# Patient Record
Sex: MALE | Race: WHITE | Employment: OTHER | URBAN - METROPOLITAN AREA
[De-identification: names, ages, dates, MRNs, and addresses within clinical notes are randomized per-mention and may not be internally consistent; named-entity substitution may affect disease eponyms.]

---

## 2018-09-10 ENCOUNTER — TRANSFERRED RECORDS (OUTPATIENT)
Dept: HEALTH INFORMATION MANAGEMENT | Facility: CLINIC | Age: 68
End: 2018-09-10

## 2019-05-30 ENCOUNTER — TRANSFERRED RECORDS (OUTPATIENT)
Dept: HEALTH INFORMATION MANAGEMENT | Facility: CLINIC | Age: 69
End: 2019-05-30

## 2019-07-30 ENCOUNTER — TRANSFERRED RECORDS (OUTPATIENT)
Dept: HEALTH INFORMATION MANAGEMENT | Facility: CLINIC | Age: 69
End: 2019-07-30

## 2019-10-07 ENCOUNTER — TRANSFERRED RECORDS (OUTPATIENT)
Dept: HEALTH INFORMATION MANAGEMENT | Facility: CLINIC | Age: 69
End: 2019-10-07

## 2019-11-20 ENCOUNTER — TRANSFERRED RECORDS (OUTPATIENT)
Dept: HEALTH INFORMATION MANAGEMENT | Facility: CLINIC | Age: 69
End: 2019-11-20

## 2020-01-14 ENCOUNTER — TRANSFERRED RECORDS (OUTPATIENT)
Dept: HEALTH INFORMATION MANAGEMENT | Facility: CLINIC | Age: 70
End: 2020-01-14

## 2020-06-19 ENCOUNTER — TRANSFERRED RECORDS (OUTPATIENT)
Dept: HEALTH INFORMATION MANAGEMENT | Facility: CLINIC | Age: 70
End: 2020-06-19

## 2020-09-30 ENCOUNTER — MEDICAL CORRESPONDENCE (OUTPATIENT)
Dept: HEALTH INFORMATION MANAGEMENT | Facility: CLINIC | Age: 70
End: 2020-09-30

## 2020-09-30 ENCOUNTER — TRANSFERRED RECORDS (OUTPATIENT)
Dept: HEALTH INFORMATION MANAGEMENT | Facility: CLINIC | Age: 70
End: 2020-09-30

## 2020-10-01 ENCOUNTER — REFERRAL (OUTPATIENT)
Dept: TRANSPLANT | Facility: CLINIC | Age: 70
End: 2020-10-01

## 2020-10-01 DIAGNOSIS — J84.112 IPF (IDIOPATHIC PULMONARY FIBROSIS) (H): Primary | ICD-10-CM

## 2020-10-01 NOTE — LETTER
October 2, 2020      Eric Victor  [address]  [facility] [address]        Dear Eric,    Thank you for your interest in the Transplant Center at [facility], [facility]. We look forward to being a part of your care team and assisting you through the transplant process.    As we discussed, your transplant coordinator is Gloria Cloud (Lung).  You may call your coordinator at any time with questions or concerns.  Your first scheduled call will be on October 15, 2020 between 10:00 am and 12:00 pm.  If this needs to change, call [phone].    Please complete the following.    1. Fill out and return the enclosed forms    Authorization for Electronic Communication    Authorization to Discuss Protected Health Information    Authorization for Release of Protected Health Information    Authorization for Care Everywhere Release of Information    2. Sign up for:    Coinfloorjarettt, access to your electronic medical record (see enclosed pamphlet)    [facility] [email or web address], a transplant education website  You can use these tools to learn more about your transplant, communicate with your care team, and track your medical details    Sincerely,    Solid Organ Transplant  [facility], [facility]  cc:  Michelle Kent MD (PCP), constance Moody MD(Referring)

## 2020-10-01 NOTE — LETTER
Date: 2020    To: [facility] Film Room      Fax: [phone]  Re: Eric Victor    1950                 IMAGES NEEDED    Your patient, was referred and is being evaluated as a possible solid organ transplant candidate at [facility]. In order to make the best possible recommendations for this patient, we require the following IMAGES:      Chest CT   (All images done within the last 24 months or most recent)    Chest Xray (most recent)    Echocardiogram (most recent)    All requested information needs to arrive at our facility within 3-5 business days      Please fax all reports and paper records to [phone] .    Requested imaging may be electronically sent to the [facility] imaging system via [id number] DICOM connection. When unable to send imaging electronically, an exported DICOM CD may be sent. Please indicate when imaging has been sent electronically on your return cover sheet.    Requested pathology slides should be accompanied by the appropriate report from your institution.    If the patient is hand carrying requested records or the requested records are not at your facility, please indicate this information on a return cover sheet.    If you have any questions or problems, call our office at [phone].      [facility]  Solid Organ Transplant Office  Attn:    [address], [facility] [address]  [place] [address]    Sincerely,      Solid Organ Transplant Admin

## 2020-10-01 NOTE — Clinical Note
Appointment Request: NPT with Dr. Souza on 11/3 at 1:10 pm by video New or Return Visit: new In Person Visit?: No  Reason for Visit/Diagnosis: ILD Orders Placed: N/A  Appointment Timeframe Requested: 11/03 Patient Aware? Yes. Physician Override Approved? N/A   Thank you,  Gloria Cloud RN

## 2020-10-01 NOTE — TELEPHONE ENCOUNTER
Patient Call: Eric. Calling back to start lung referral Please call his cell # [phone]      Call back needed? Yes    Return Call Needed  Same as documented in contacts section  When to return call?: Same day: Route High Priority

## 2020-10-02 VITALS — HEIGHT: 71 IN | BODY MASS INDEX: 21.56 KG/M2 | WEIGHT: 154 LBS

## 2020-10-02 SDOH — HEALTH STABILITY: MENTAL HEALTH: HOW OFTEN DO YOU HAVE 6 OR MORE DRINKS ON ONE OCCASION?: LESS THAN MONTHLY

## 2020-10-02 SDOH — HEALTH STABILITY: MENTAL HEALTH: HOW OFTEN DO YOU HAVE A DRINK CONTAINING ALCOHOL?: NOT ASKED

## 2020-10-02 SDOH — HEALTH STABILITY: MENTAL HEALTH: HOW MANY STANDARD DRINKS CONTAINING ALCOHOL DO YOU HAVE ON A TYPICAL DAY?: NOT ASKED

## 2020-10-02 ASSESSMENT — MIFFLIN-ST. JEOR: SCORE: 1470.67

## 2020-10-02 NOTE — TELEPHONE ENCOUNTER
SOT LUNG INTAKE DAVID    October 1, 2020    Referring Provider: Zuleima Moreira MD  Primary Provider: Michelle Kent (only 1 visit)  Specialist: none  Diagnosis:IPF  Source/Facility: [facility]    Smoking/nicotine use history: no  Alcohol use history: less than 1 per month  Drug use history: none  Cancer history:  none  Cardiac history:  none  BMI:21.5  O2 supplement at noc prn    Notes: Due to COVID, verbal consent received for Care Everywhere Authorization from the patient during this call.    Is patient in a group home/assisted living? no  Does patient have a guardian? no    Referral intake process completed.  Patient is aware that after financial approval is received, medical records will be requested.   Patient confirmed for a callback from transplant coordinator on October 15, 2020. (within 2 weeks)  Tentative evaluation date TBD. (within 4 weeks)    Confirmed coordinator will discuss evaluation process in more detail at the time of their call.   Patient is aware of the need to arrange age appropriate cancer screening, vaccinations, and dental care.  Reminded patient to complete questionnaire, complete medical records release, and review packet prior to evaluation visit .  Assessed patient for special needs (ie--wheelchair, assistance, guardian, and ):  none   Patient instructed to call [phone] with questions.

## 2020-10-06 ENCOUNTER — TRANSFERRED RECORDS (OUTPATIENT)
Dept: HEALTH INFORMATION MANAGEMENT | Facility: CLINIC | Age: 70
End: 2020-10-06

## 2020-10-15 NOTE — TELEPHONE ENCOUNTER
"SOT LUNG INTAKE    October 15, 2020    Eric Victor  [id number]  Referring Provider: Zuleima Moreira   Source/Facility: Care Everywhere/Epic/patient   Referral packet and welcome letter received? No, requested that it be re-sent.     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  70 year old    Height: 5' 11\"  Weight: 154 lbs  BMI: 21.5    Social History:    Social History     Socioeconomic History     Marital status:      Spouse name: Not on file     Number of children: Not on file     Years of education: Not on file     Highest education level: Not on file   Occupational History     Not on file   Social Needs     Financial resource strain: Not on file     Food insecurity     Worry: Not on file     Inability: Not on file     Transportation needs     Medical: Not on file     Non-medical: Not on file   Tobacco Use     Smoking status: Never Smoker     Smokeless tobacco: Never Used   Substance and Sexual Activity     Alcohol use: Yes     Binge frequency: Less than monthly     Drug use: Never     Sexual activity: Not on file   Lifestyle     Physical activity     Days per week: Not on file     Minutes per session: Not on file     Stress: Not on file   Relationships     Social connections     Talks on phone: Not on file     Gets together: Not on file     Attends Scientologist service: Not on file     Active member of club or organization: Not on file     Attends meetings of clubs or organizations: Not on file     Relationship status: Not on file     Intimate partner violence     Fear of current or ex partner: Not on file     Emotionally abused: Not on file     Physically abused: Not on file     Forced sexual activity: Not on file   Other Topics Concern     Parent/sibling w/ CABG, MI or angioplasty before 65F 55M? Not Asked   Social History Narrative     Not on file         Second-hand Smoke exposure: no    Family History:    No family history on file.    Past Medical History  Pulmonary " Manifestations date: Was first seen by Dr. Jon in 2013.  At the time had been aware of ILD for 3 years.  Had admission with ARDS around 2010.    Details: Since being followed by Dr. Jon, has steadily worsened more recently and is now interested in transplant.  Off systemic steroids and chronic azithromycin since 2016.  Flare in May 2019, treated with steroids.  Pred up and down since then, started OFEV in Feb 2020.    Diabetes: no  Coronary Artery Disease: no  Hypertension: no   Previous transfusion(s): yes, 2009 with lung biopsy  History of Cancer: no   GERD: no   Bleeding/Clotting/HIT Disorders: no  GI/ history: no, except for diarrhea with OFEV, decreased dose to 100 mg and OK    Other Past Medical History:      Past Medical History:   Diagnosis Date     History of blood transfusion 2009    [facility]     Uncomplicated asthma        Surgical History   Lung Biopsy: [facility]: wedge biopsy RLL, RUL 10/09/2009 patchy organizing acute lung injury with surrounding  cellular interstitial infiltrates. Mild airway changes consistent  with chronic asthma. (see outside report).   Per Eric Braswell: feels that the  changes here are those of organizing acute lung injury that is  histologically nonspecific. He goes on to say that his  differential diagnosis would include organizing infection,  evolving connective tissue disease, cryptogenic organizing  pneumonitis/organizing eosinophilic pneumonia, drug reaction and  other rare entities. He favors a steroid-treated eosinophilic  pneumonia.      Obtained in setting of hypoxic respiratory failure, felt to be eosinophilic pneumonia.   Post op bleeding which led to redo thoracotomy on 10/10--clot evacuated.  Extubated 10/12. Chest tube out 10/20.    Additional bronch/BAL 2/28/12 Positive for hemosiderin macrophages and rare eosinophils.    Pneumothorax: delayed surgical recovery above  Chest Surgery: yes    Past Surgical History:   Procedure Laterality Date      BIOPSY  2009    [facility]     BIOPSY  2018    colon polyp, [place]     COLONOSCOPY  2018     ORTHOPEDIC SURGERY  1990    hand       Mental Health History  Depression: not discussed   Anxiety:    Other:      Medications  Current Outpatient Medications   Medication     aspirin 81 MG chewable tablet     azithromycin (ZITHROMAX) 250 MG tablet     mometasone-formoterol (DULERA) 100-5 MCG/ACT oral inhaler     predniSONE (DELTASONE) 5 MG tablet     No current facility-administered medications for this visit.      Blood thinner hx: no  Prednisone hx: full time since 2009, currently at 5 mg, occasional bursts to 40 mg. ?Off from [id number]   Antibiotic hx: rare, was on chronic azithromycin for 6 months or so, not since  Narcotic hx: never     Allergies  Patient has no known allergies.      Pulmonary Tests and Status  PFT's  Date: 1/14/20   FVC 1.43, 31%   FEV1 1.22 36%  DLCO 10.02, 31%  Date: 5/30/19   FVC  1.81, ?       FEV1  1.27, 35%  DLCO 10.32, 30    ABG's  Not found    6 Minute Walk: maybe a year or two  ago    Oxygen use--has oximeter, but has Raynauds, so it is hard to track   At rest: not using at rest, activity is limited, usually 93-95 at rest    Sleep: occasionally at night, sleeps better with it so usually using at night at 2 liters.   With Activity: used with activity in [place]   Date of O2 initiation: a months ago    Oxygen Company: [employer]   Contact name/number:      Sleep Study: has done an overnight oximetry study, just completed one on 2 liters, scanned in, 10/06/20--brief time <88.    BiPAP/CPAP: no    Pulmonary Rehab: no, but is very active, walks and weight lifting daily until COVID hit, unable to go to gym.    Date:   Location:        Current activity:      Current activity:  Basic ADLs    Feeding:    Toileting:     Selecting proper attire:    Grooming:    Maintaining continence:    Putting on clothing: slower, needs to stop to catch his breath   Bathing:    Walking &  Transferring: needs to stop at the top of the stairs.     Instrumental ADLs    Managing Finances:    Handling Transportation: yes   Shopping: yes, a little more work with using mask for COVID  Preparing meals:    Using telephone & communication devices:    Managing medications: independent  Housework & basic home maintenance: no longer mows, does do vacuuming.  Wife does the groceries.       Hospitalizations in prior 12 months  none    Diagnostic Tests/Imaging  Heart cath: no, no family history of CAD  Stress Test: no  ECHO: most recent 5/13/19: Interpretation Summary    * The left ventricular systolic function is normal, estimated LVEF 55-60%.    * The proximal ascending aorta is mildly dilated measuring 4.2 cm.    * Compared to prior study dated 5/30/2018 (TTE): similar findings; MR  appears mild in today's study.    Chest CT: 10/07/19: IMPRESSION:   1.  Pulmonary fibrosis with honeycombing at the lung bases. This is unchanged since the most recent exam, but has progressed when compared to more remote exams.  2.  No new acute finding or groundglass parenchymal opacity.    DEXA: 9/10/18  Osteoporosis  Lumbar Spine (L1-L4):  Average BMD (gm/cm2): 0.810  T-score: -2.6  Z-score: -1.7    Upper GI: no    Primary Care  Health Maintenance   Topic Date Due     HEPATITIS C SCREENING  1950     ADVANCE CARE PLANNING  1950     COLORECTAL CANCER SCREENING  06/26/1958     DTAP/TDAP/TD IMMUNIZATION (1 - Tdap) 06/26/1973     LIPID  06/26/1983     ZOSTER IMMUNIZATION (2 of 3) 09/24/2012     MEDICARE ANNUAL WELLNESS VISIT  06/26/2013     FALL RISK ASSESSMENT  06/26/2013     Pneumococcal Vaccine: 65+ Years (1 of 1 - PPSV23) 06/26/2013     AORTIC ANEURYSM SCREENING (SYSTEM ASSIGNED)  06/26/2013     PHQ-2  01/01/2020     INFLUENZA VACCINE  Completed     Pneumococcal Vaccine: Pediatrics (0 to 5 Years) and At-Risk Patients (6 to 64 Years)  Aged Out     IPV IMMUNIZATION  Aged Out     MENINGITIS IMMUNIZATION  Aged Out      HEPATITIS B IMMUNIZATION  Aged Out     PSA: 8/20/18: 1.54    Colonoscopy: 10/19/18: multiple (9) polyps, tubular and tubulovillous adenomas  Dental: has a tooth that is due to be pulled.  Otherwise is up to date, goes in annually  Hepatitis A/B: not found   Pneumovax: Prevnar 13 8/20/18, Pneumovax 10/12/16, 9/01/11    Labs  A1AT: not found  Rheumatology: 2009, elevated IgA,   Cystic Fibrosis: n/a   Cultures: none concerning        Psycho-Social Assessment  Spouse/Significant Other/Partner: , 3 sons, 9 grandchildren    Location:     Distance from [facility]:    Support System:     Location:     Distance from [facility]:      Employment Status: Retired 2013  (Full-time, part-time, disabled, etc.)  Occupation:  in manufacturing environment  Work history:    Toxic Substance Exposure: watched air quality seriously at work, 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   (Apartment, house, stairs, mold, etc.)  Pets/Birds: dog    Home Health care utilized:      Financial Concerns:        Notes: discussed consult visit.  Discussed age as a relative contraindication, no other concerning past medical history      Plan: clinic visit with Dr. Souza 11/03 by video     Concerns: osteoporosis but had remained active, working out--limited by covid.  Longer prednisone history, colon needs monitoring.

## 2020-11-02 PROBLEM — I73.00 RAYNAUD'S DISEASE WITHOUT GANGRENE: Status: ACTIVE | Noted: 2017-02-08

## 2020-11-02 NOTE — PROGRESS NOTES
[facility]  [facility]  Transplant Clinic - Initial Visit -  November 3, 2020    Eric Victor is a 70 year old male who is being evaluated via a billable video visit.      Video-Visit Details    Type of service:  Video Visit    Video Start Time: 1:10 PM  Video End Time: 2:05 PM    Originating Location (pt. Location): Home    Distant Location (provider location):  [facility]     Platform used for Video Visit: Becky Souza MD             Assessment and Plan:   Eric Victor is a 70 year old male with history of ILD who is seen today for evaluation for lung transplantation.    1) Evaluation for lung transplantation  - There is no absolute contraindication identified today.  Some concerns are listed below:    A) Possible CKD  -His last BMP is from 8/2018 and showed creatinine of 1.26, up from his baseline of 0.8-0.9, corresponding to estimated GFR of 57.  It was explained to him that if he has significant CKD, given his advanced age he will likely not be considered a candidate at our center.  -We should repeat a BMP ASAP and determine whether he should move forward.    B) Advanced Age  -He is 70 years old this year, and thus his selection criteria for lung transplantation will be more vigorous.  He understands this.  He also understands that age his likely an independent risk factor for morbidity and mortality, although it is taken in context with other factors such as functional status, frailty, and comorbidities.     C) Concern for Coronary Artery Disease  -He is advanced in age and may have CKD.  He will need a left and right heart catheterization prior to selection meeting.    D) Dilation of His Ascending Aorta  -This should not be considered a contraindication.  This has been followed regularly, and has been stable at 4.2 cm on his most recent echocardiogram from 5/2019.    - In addition to a complete history and physical, I  "discussed transplant at length with the patient.  We reviewed the risks and benefits of transplantation.  Our discussion included the recent survival statistics.  I discussed rejection, acute and chronic.  I discussed the need for surveillance biopsies.  I reviewed the standard immunosuppression and typical side effects, including renal failure.  I discussed the increased risk of infection and the use of prophylactic antibiotics. I reviewed the increased risk of malignancy. I discussed the listing system, including the Lung Allocation Score.  I discussed the typical operative and postoperative course, and possible complications.  I reviewed the usual clinic followup.  I explained to the patient that he is required to stay in the [facility] for three months following transplantation and that he  will need to have someone accompanying him during that time.  I discussed the importance of strict medication and clinic adherence after transplantation.    -After a long discussion today, while he does have some doubts he would like to go forward with evaluation.  He states that he would like to \"live\", not just be alive after transplantation and enjoy time with his family including his wife, 3 sons and 9 grandchildren.  We discussed the complex nature of defining success after lung transplantation, and individual variation in with an acceptable quality of life.  His goal after transplantation is to enjoy time with his family, push his grandchildren on a swing, and if possible teach them fishing and spend quality time.      In addition, we discussed the current SARS-CoV-2 pandemic and what this might mean for transplant recipients in terms of visitor policy support system.    Coordinator/MD: Pedro Luis/Libby    RTC: After evaluation completed  Influenza and other vaccinations: He should receive a flu shot for the season      Dorita Souza MD [employer]  Pulmonary and Critical Care Medicine            Problem List:     1. Evaluation " "for lung transplantation    2. Interstitial lung disease   a. Chronic eosinophilic pneumonia, on chronic prednisone 5mg  b. Started on nintedanib 2/2020    3. Mild dilatation of ascending aorta  a. Stable 4.2 cm on his most recent echocardiogram ([facility]; 5/13/2019)        History of Present Illness:   Eric Victor is a 70 year old male with history of ILD who is seen today for evaluation for lung transplantation.    Diagnosed with IPF in 10/2009.  It was during the swine flu pandemic, developed low-grade fever that wouldn't go away.  Was normally active, running and biking, but had to quit and felt exhausted.  Last marathon was when he was 59yo.  9/26 went for a run and fever worsened.  Was admitted to the hospital and was placed on mechanical ventilation. Was intubated for about a week, got a surgical lung biopsy and initial read as \"fatal\", different read by [facility].    In 2012 he got pneumonia and was on mechanical ventilation at that time as well. He was intubated for a total of 2 weeks but never got trached. He was on a wheelchair by discharge, but in terms of activity level after several months of rehab was back to shoveling snow, mowing lawn.     2/2020 started on Ofev after noticing decline since May 2019. PFT in January showed decline as well.  Would be treated with Prednisone burst 20-30mg about 3-4 times in the last 1-2 year with improvement. Always has thick sputum before the declines. Every time Prednisone was decreased to 5mg or less he flared, and has been on 5mg daily since a few months ago.  Last flare up was June.      Was pushing his  as recent as last summer to mow his lawn.  He does a 250 feet walk with a slight incline - last year he was able to do that without stopping.  Now, can do it twice in a day, but has to stop in the middle. Showering has become more difficult these days.  Biggest problem is with bending down.  Can climb up a flight of stairs but has to " "stop and catch his breath at the top.  Up until COVID, was going to the gym every day and lifted weights, squatting etc. But sine then only exercise he gets is walking.    Uses 2L/min supplemental oxygen only at night. Has not had an ambulatory pulse oximeter in over a year. Just had an overnight oximetry 2L/min done and was told \"everthing is fine\".      Has had Raynaud's phenomenon, cold/numb finger in the cold.  Had exercise-induced asthma and took Albuterol PRN, onset about 41 yo. Has a remote history of Afib around the time of his hospitalization in 2009.     Last colonoscopy in 2018, recommend 1 year interval follow-up due to finding 8 polyps, path showed tubular and tubulovillous adenomas.  Up to date on dental except for one tooth that need to be pulled.    Lor his wife will be the primary support person.  Middle son Jasiel and youngest son Shahram both live in the [facility].           Review of Systems:   Please see HPI, otherwise the complete 10 point ROS is negative.           Past Medical and Surgical History:     Past Medical History:   Diagnosis Date     History of blood transfusion 2009    [facility]     Uncomplicated asthma      Past Surgical History:   Procedure Laterality Date     BIOPSY  2018    colon polyp, [place]     CL AFF SURGICAL PATHOLOGY  2009    [facility]     COLONOSCOPY  2018     ORTHOPEDIC SURGERY  1990    hand           Family History:     Family History   Problem Relation Age of Onset     Lupus Mother      Alzheimer Disease Mother         passed away in her 80's      Raynaud syndrome Mother      Liver Cancer Father         passed away from liver cancer in his mid 80's     Bone Cancer Father      Prostate Cancer Father             Social History:     Social History     Socioeconomic History     Marital status:      Spouse name: Not on file     Number of children: Not on file     Years of education: Not on file     Highest education level: Not on file   Occupational " History     Not on file   Social Needs     Financial resource strain: Not on file     Food insecurity     Worry: Not on file     Inability: Not on file     Transportation needs     Medical: Not on file     Non-medical: Not on file   Tobacco Use     Smoking status: Never Smoker     Smokeless tobacco: Never Used     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   Substance and Sexual Activity     Alcohol use: Yes     Frequency: 2-4 times a month     Drinks per session: 1 or 2     Binge frequency: Never     Drug use: Never     Sexual activity: Not on file   Lifestyle     Physical activity     Days per week: Not on file     Minutes per session: Not on file     Stress: Not on file   Relationships     Social connections     Talks on phone: Not on file     Gets together: Not on file     Attends Buddhist service: Not on file     Active member of club or organization: Not on file     Attends meetings of clubs or organizations: Not on file     Relationship status: Not on file     Intimate partner violence     Fear of current or ex partner: Not on file     Emotionally abused: Not on file     Physically abused: Not on file     Forced sexual activity: Not on file   Other Topics Concern     Parent/sibling w/ CABG, MI or angioplasty before 65F 55M? 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         Lives in [place].  Lived in [place] his entire life, has a cabin up [place].         Has three sons, eldest lives in [place].            Medications:     Current Outpatient Medications   Medication     calcium carbonate 600 mg-vitamin D 400 units (CALTRATE) 600-400 MG-UNIT per tablet     cholecalciferol 25 MCG (1000 UT) TABS     nintedanib (OFEV) 100 MG capsule     predniSONE (DELTASONE) 5 MG tablet     No current facility-administered medications for this visit.             Physical Exam:   Constitutional - looks  well, in no apparent distress  Eyes - no redness or discharge  Respiratory -breathing appears comfortable.  No cough.  Skin - No appreciable discoloration or lesions (very limited exam)  Neurological - No apparent tremors. Speech fluent and articlate  Psychiatric - no signs of delirium or anxiety     Exam limited to that easily identified on a virtual visit. The rest of a comprehensive physical examination is deferred due to PHE (public health emergency) video visit restrictions.         Data:   All laboratory and imaging data reviewed.      No results found for this or any previous visit (from the past 168 hour(s)).       Date Place TLC (%) FVC (%) FEV1 (%) FEV1/FVC DLCO (%) Note                               5/30/2019 [facility]   1.81  1.27 35  10.32 30    1/14/2020 [facility]   1.43 31 1.22 36  10.02 31             Imaging:     CT HR ([facility]; 10/7/2019)  IMPRESSION:   1.  Pulmonary fibrosis with honeycombing at the lung bases. This is unchanged since the most recent exam, but has progressed when compared to more remote exams.  2.  No new acute finding or groundglass parenchymal opacity.         Cardiac:     TTE ([facility]; 5/13/2019)  Interpretation Summary    * The left ventricular systolic function is normal, estimated LVEF 55-60%.    * The proximal ascending aorta is mildly dilated measuring 4.2 cm.    * Compared to prior study dated 5/30/2018 (TTE): similar findings; MR  appears mild in today's study.         Other:

## 2020-11-03 ENCOUNTER — VIRTUAL VISIT (OUTPATIENT)
Dept: TRANSPLANT | Facility: CLINIC | Age: 70
End: 2020-11-03
Attending: INTERNAL MEDICINE
Payer: MEDICARE

## 2020-11-03 DIAGNOSIS — J84.9 ILD (INTERSTITIAL LUNG DISEASE) (H): Primary | ICD-10-CM

## 2020-11-03 DIAGNOSIS — I77.810 MILD DILATION OF ASCENDING AORTA (H): ICD-10-CM

## 2020-11-03 DIAGNOSIS — J84.9 ILD (INTERSTITIAL LUNG DISEASE) (H): ICD-10-CM

## 2020-11-03 DIAGNOSIS — Z01.818 ENCOUNTER FOR PRE-TRANSPLANT EVALUATION FOR LUNG TRANSPLANT: Primary | ICD-10-CM

## 2020-11-03 DIAGNOSIS — Z79.899 ENCOUNTER FOR LONG-TERM (CURRENT) USE OF HIGH-RISK MEDICATION: ICD-10-CM

## 2020-11-03 PROCEDURE — 99205 OFFICE O/P NEW HI 60 MIN: CPT | Mod: 95 | Performed by: INTERNAL MEDICINE

## 2020-11-03 SDOH — HEALTH STABILITY: MENTAL HEALTH: HOW OFTEN DO YOU HAVE A DRINK CONTAINING ALCOHOL?: 2-4 TIMES A MONTH

## 2020-11-03 SDOH — HEALTH STABILITY: MENTAL HEALTH: HOW MANY STANDARD DRINKS CONTAINING ALCOHOL DO YOU HAVE ON A TYPICAL DAY?: 1 OR 2

## 2020-11-03 SDOH — HEALTH STABILITY: MENTAL HEALTH: HOW OFTEN DO YOU HAVE 6 OR MORE DRINKS ON ONE OCCASION?: NEVER

## 2020-11-03 NOTE — NURSING NOTE
Patient accompanied by: wife Lor for video visit   Current activity level: no exercise equipment at home, stays active but doing less than past.  Able to do stairs   Pulmonary Rehab: not currently due to covid  Recommendations: exercise, strengthening, endurance   Patient status assessment updated.    Current oxygen use:  Using 2 liters only at night, difficult to assess need with activity due to Raynauds.     Assisted Ventilation: no  Diabetic status: no (has required medication in past likely related to steroid dose   Data Unavailable 0 lbs 0 oz There is no height or weight on file to calculate BMI.    Medication changes: no   Plan: need to assess kidney function and verify oxygen needs with activity.  Per Dr. Souza, ordering oxygen titration study with forehead probe to be done and CSC, and will do BMP on same day.  [place] Monday [place]ues[place]

## 2020-11-03 NOTE — LETTER
11/3/2020         RE: Eric Victor  [address]  [facility] [address]        Dear Colleague,    Thank you for referring your patient, Eric Victor, to the [facility] TRANSPLANT [facility]IN[facility]. Please see a copy of my visit note below.    [facility]  Transplant Clinic - Initial Visit -  November 3, 2020    Eric Victor is a 70 year old male who is being evaluated via a billable video visit.      Video-Visit Details    Type of service:  Video Visit    Video Start Time: 1:10 PM  Video End Time: 2:05 PM    Originating Location (pt. Location): Home    Distant Location (provider location):  [facility] TRANSPL[facility]     Platform used for Video Visit: Becky Souza MD             Assessment and Plan:   Eric Victor is a 70 year old male with history of ILD who is seen today for evaluation for lung transplantation.    1) Evaluation for lung transplantation  - There is no absolute contraindication identified today.  Some concerns are listed below:    A) Possible CKD  -His last BMP is from 8/2018 and showed creatinine of 1.26, up from his baseline of 0.8-0.9, corresponding to estimated GFR of 57.  It was explained to him that if he has significant CKD, given his advanced age he will likely not be considered a candidate at our center.  -We should repeat a BMP ASAP and determine whether he should move forward.    B) Advanced Age  -He is 70 years old this year, and thus his selection criteria for lung transplantation will be more vigorous.  He understands this.  He also understands that age his likely an independent risk factor for morbidity and mortality, although it is taken in context with other factors such as functional status, frailty, and comorbidities.     C) Concern for Coronary Artery Disease  -He is advanced in age and may have CKD.  He will need a left and right heart catheterization prior to selection  "meeting.    D) Dilation of His Ascending Aorta  -This should not be considered a contraindication.  This has been followed regularly, and has been stable at 4.2 cm on his most recent echocardiogram from 5/2019.    - In addition to a complete history and physical, I discussed transplant at length with the patient.  We reviewed the risks and benefits of transplantation.  Our discussion included the recent survival statistics.  I discussed rejection, acute and chronic.  I discussed the need for surveillance biopsies.  I reviewed the standard immunosuppression and typical side effects, including renal failure.  I discussed the increased risk of infection and the use of prophylactic antibiotics. I reviewed the increased risk of malignancy. I discussed the listing system, including the Lung Allocation Score.  I discussed the typical operative and postoperative course, and possible complications.  I reviewed the usual clinic followup.  I explained to the patient that he is required to stay in the [facility] for three months following transplantation and that he  will need to have someone accompanying him during that time.  I discussed the importance of strict medication and clinic adherence after transplantation.    -After a long discussion today, while he does have some doubts he would like to go forward with evaluation.  He states that he would like to \"live\", not just be alive after transplantation and enjoy time with his family including his wife, 3 sons and 9 grandchildren.  We discussed the complex nature of defining success after lung transplantation, and individual variation in with an acceptable quality of life.  His goal after transplantation is to enjoy time with his family, push his grandchildren on a swing, and if possible teach them fishing and spend quality time.      In addition, we discussed the current SARS-CoV-2 pandemic and what this might mean for transplant recipients in terms of visitor policy " "support system.    Coordinator/MD: Nick    RTC: After evaluation completed  Influenza and other vaccinations: He should receive a flu shot for the season      Doriat Souza MD MSC[employer]  Pulmonary and Critical Care Medicine            Problem List:     1. Evaluation for lung transplantation    2. Interstitial lung disease   a. Chronic eosinophilic pneumonia, on chronic prednisone 5mg  b. Started on nintedanib 2/2020    3. Mild dilatation of ascending aorta  a. Stable 4.2 cm on his most recent echocardiogram ([facility]; 5/13/2019)        History of Present Illness:   Eric Victor is a 70 year old male with history of ILD who is seen today for evaluation for lung transplantation.    Diagnosed with IPF in 10/2009.  It was during the swine flu pandemic, developed low-grade fever that wouldn't go away.  Was normally active, running and biking, but had to quit and felt exhausted.  Last marathon was when he was 59yo.  9/26 went for a run and fever worsened.  Was admitted to the hospital and was placed on mechanical ventilation. Was intubated for about a week, got a surgical lung biopsy and initial read as \"fatal\", different read by [facility].    In 2012 he got pneumonia and was on mechanical ventilation at that time as well. He was intubated for a total of 2 weeks but never got trached. He was on a wheelchair by discharge, but in terms of activity level after several months of rehab was back to shoveling snow, mowing lawn.     2/2020 started on Ofev after noticing decline since May 2019. PFT in January showed decline as well.  Would be treated with Prednisone burst 20-30mg about 3-4 times in the last 1-2 year with improvement. Always has thick sputum before the declines. Every time Prednisone was decreased to 5mg or less he flared, and has been on 5mg daily since a few months ago.  Last flare up was June.      Was pushing his  as recent as last summer to mow his lawn.  He does a 250 feet walk " "with a slight incline - last year he was able to do that without stopping.  Now, can do it twice in a day, but has to stop in the middle. Showering has become more difficult these days.  Biggest problem is with bending down.  Can climb up a flight of stairs but has to stop and catch his breath at the top.  Up until COVID, was going to the gym every day and lifted weights, squatting etc. But sine then only exercise he gets is walking.    Uses 2L/min supplemental oxygen only at night. Has not had an ambulatory pulse oximeter in over a year. Just had an overnight oximetry 2L/min done and was told \"everthing is fine\".      Has had Raynaud's phenomenon, cold/numb finger in the cold.  Had exercise-induced asthma and took Albuterol PRN, onset about 39 yo. Has a remote history of Afib around the time of his hospitalization in 2009.     Last colonoscopy in 2018, recommend 1 year interval follow-up due to finding 8 polyps, path showed tubular and tubulovillous adenomas.  Up to date on dental except for one tooth that need to be pulled.    Lor his wife will be the primary support person.  Middle son Jasiel and youngest son Shahram both live in the [facility].           Review of Systems:   Please see HPI, otherwise the complete 10 point ROS is negative.           Past Medical and Surgical History:     Past Medical History:   Diagnosis Date     History of blood transfusion 2009    [facility]     Uncomplicated asthma      Past Surgical History:   Procedure Laterality Date     BIOPSY  2018    colon polyp, [place]      AFF SURGICAL PATHOLOGY  2009    [facility]     COLONOSCOPY  2018     ORTHOPEDIC SURGERY  1990    hand           Family History:     Family History   Problem Relation Age of Onset     Lupus Mother      Alzheimer Disease Mother         passed away in her 80's      Raynaud syndrome Mother      Liver Cancer Father         passed away from liver cancer in his mid 80's     Bone Cancer Father      Prostate " Cancer Father             Social History:     Social History     Socioeconomic History     Marital status:      Spouse name: Not on file     Number of children: Not on file     Years of education: Not on file     Highest education level: Not on file   Occupational History     Not on file   Social Needs     Financial resource strain: Not on file     Food insecurity     Worry: Not on file     Inability: Not on file     Transportation needs     Medical: Not on file     Non-medical: Not on file   Tobacco Use     Smoking status: Never Smoker     Smokeless tobacco: Never Used     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   Substance and Sexual Activity     Alcohol use: Yes     Frequency: 2-4 times a month     Drinks per session: 1 or 2     Binge frequency: Never     Drug use: Never     Sexual activity: Not on file   Lifestyle     Physical activity     Days per week: Not on file     Minutes per session: Not on file     Stress: Not on file   Relationships     Social connections     Talks on phone: Not on file     Gets together: Not on file     Attends Nondenominational service: Not on file     Active member of club or organization: Not on file     Attends meetings of clubs or organizations: Not on file     Relationship status: Not on file     Intimate partner violence     Fear of current or ex partner: Not on file     Emotionally abused: Not on file     Physically abused: Not on file     Forced sexual activity: Not on file   Other Topics Concern     Parent/sibling w/ CABG, MI or angioplasty before 65F 55M? 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         Lives in [place].  Lived in [place] his entire life, has a [place].         Has three sons, eldest lives in [place].            Medications:     Current Outpatient Medications   Medication     calcium carbonate 600 mg-vitamin D 400 units  (CALTRATE) 600-400 MG-UNIT per tablet     cholecalciferol 25 MCG (1000 UT) TABS     nintedanib (OFEV) 100 MG capsule     predniSONE (DELTASONE) 5 MG tablet     No current facility-administered medications for this visit.             Physical Exam:   Constitutional - looks well, in no apparent distress  Eyes - no redness or discharge  Respiratory -breathing appears comfortable.  No cough.  Skin - No appreciable discoloration or lesions (very limited exam)  Neurological - No apparent tremors. Speech fluent and articlate  Psychiatric - no signs of delirium or anxiety     Exam limited to that easily identified on a virtual visit. The rest of a comprehensive physical examination is deferred due to PHE (public health emergency) video visit restrictions.         Data:   All laboratory and imaging data reviewed.      No results found for this or any previous visit (from the past 168 hour(s)).       Date Place TLC (%) FVC (%) FEV1 (%) FEV1/FVC DLCO (%) Note                               5/30/2019 [facility]   1.81  1.27 35  10.32 30    1/14/2020 [facility]   1.43 31 1.22 36  10.02 31             Imaging:     CT HR ([facility]; 10/7/2019)  IMPRESSION:   1.  Pulmonary fibrosis with honeycombing at the lung bases. This is unchanged since the most recent exam, but has progressed when compared to more remote exams.  2.  No new acute finding or groundglass parenchymal opacity.         Cardiac:     TTE ([facility]; 5/13/2019)  Interpretation Summary    * The left ventricular systolic function is normal, estimated LVEF 55-60%.    * The proximal ascending aorta is mildly dilated measuring 4.2 cm.    * Compared to prior study dated 5/30/2018 (TTE): similar findings; MR  appears mild in today's study.         Other:           Again, thank you for allowing me to participate in the care of your patient.        Sincerely,        Dorita Souza MD

## 2020-11-03 NOTE — Clinical Note
Please schedule oxygen titration test (6mw) with forehead probe, and lab--[place] Monday or [place] next [place] [facility]  [place] rest of week, then back the following Mon/Tues.  Thanks,  Gloria

## 2020-11-04 ENCOUNTER — TELEPHONE (OUTPATIENT)
Dept: TRANSPLANT | Facility: CLINIC | Age: 70
End: 2020-11-04

## 2020-11-04 NOTE — TELEPHONE ENCOUNTER
Called patient lvm for appt (6MW) on Tues Nov 10 at 10am and lab at 1045am; asked for a call back.    Patient called back confirmed.

## 2020-11-06 ENCOUNTER — TELEPHONE (OUTPATIENT)
Dept: TRANSPLANT | Facility: CLINIC | Age: 70
End: 2020-11-06

## 2020-11-06 NOTE — TELEPHONE ENCOUNTER
Patient Call: Archie has a few follow up questions to ask         Call back needed? Yes    Return Call Needed  Same as documented in contacts section  When to return call?: Greater than one day: Route standard priority

## 2020-11-10 DIAGNOSIS — J84.9 ILD (INTERSTITIAL LUNG DISEASE) (H): ICD-10-CM

## 2020-11-10 LAB
6 MIN WALK (FT): 550 FT
6 MIN WALK (M): 168 M
ANION GAP SERPL CALCULATED.3IONS-SCNC: 4 MMOL/L (ref 3–14)
BUN SERPL-MCNC: 22 MG/DL (ref 7–30)
CALCIUM SERPL-MCNC: 9.4 MG/DL (ref 8.5–10.1)
CHLORIDE SERPL-SCNC: 102 MMOL/L (ref 94–109)
CO2 SERPL-SCNC: 32 MMOL/L (ref 20–32)
CREAT SERPL-MCNC: 1.11 MG/DL (ref 0.66–1.25)
GFR SERPL CREATININE-BSD FRML MDRD: 66 ML/MIN/{1.73_M2}
GLUCOSE SERPL-MCNC: 91 MG/DL (ref 70–99)
POTASSIUM SERPL-SCNC: 3.9 MMOL/L (ref 3.4–5.3)
SODIUM SERPL-SCNC: 137 MMOL/L (ref 133–144)

## 2020-11-10 PROCEDURE — 94618 PULMONARY STRESS TESTING: CPT | Performed by: INTERNAL MEDICINE

## 2020-11-10 PROCEDURE — 36415 COLL VENOUS BLD VENIPUNCTURE: CPT | Performed by: PATHOLOGY

## 2020-11-10 PROCEDURE — 80048 BASIC METABOLIC PNL TOTAL CA: CPT | Performed by: PATHOLOGY

## 2020-11-10 NOTE — TELEPHONE ENCOUNTER
Spoke with Archie who had questions regarding lung transplant: questions included evaluation testing, outcomes, quality of life, etc.  Questions were answered. He wondered if some people consider transplant and ultimately decide not to proceed.  Discussed that this happens periodically.  Evaluation helps him to learn more about transplant and whether it is the right thing for him or not, and for the transplant team to better make decisions about his potential candidacy.      Archie had labs and 6mw/oxygen titration done today.  Creatinine is within normal, though low-normal.  His walk test was titrated to 4 liters--did not desaturate much on 4 liters.  Archie wonders about whether he could use a smaller portable and carry instead of pull.  Per review with Dr. Souza, could repeat a 6mw using 4 liters pulsed to see how he does.  Reasonable to proceed with transplant evaluation based on lab results.  Will confirm with Archie.

## 2020-11-11 DIAGNOSIS — R06.00 DYSPNEA: Primary | ICD-10-CM

## 2020-11-11 DIAGNOSIS — Z79.52 LONG TERM (CURRENT) USE OF SYSTEMIC STEROIDS: ICD-10-CM

## 2020-11-11 DIAGNOSIS — I25.10 CORONARY ATHEROSCLEROSIS OF NATIVE CORONARY VESSEL: ICD-10-CM

## 2020-11-11 DIAGNOSIS — R06.00 DYSPNEA, UNSPECIFIED: ICD-10-CM

## 2020-11-11 DIAGNOSIS — Z76.82 ORGAN TRANSPLANT CANDIDATE: ICD-10-CM

## 2020-11-11 DIAGNOSIS — R93.89 ABNORMAL CT OF THE CHEST: ICD-10-CM

## 2020-11-11 DIAGNOSIS — Z13.6 ENCOUNTER FOR SCREENING FOR CARDIOVASCULAR DISORDERS: ICD-10-CM

## 2020-11-11 DIAGNOSIS — Z12.5 ENCOUNTER FOR SCREENING FOR MALIGNANT NEOPLASM OF PROSTATE: ICD-10-CM

## 2020-11-11 DIAGNOSIS — Z79.899 OTHER LONG TERM (CURRENT) DRUG THERAPY: ICD-10-CM

## 2020-11-11 DIAGNOSIS — Z12.11 ENCOUNTER FOR SCREENING FOR MALIGNANT NEOPLASM OF COLON: ICD-10-CM

## 2020-11-11 DIAGNOSIS — R06.02 SHORTNESS OF BREATH: ICD-10-CM

## 2020-11-11 DIAGNOSIS — J84.9 LUNG DISEASE, INTERSTITIAL (H): ICD-10-CM

## 2020-11-11 DIAGNOSIS — Z11.59 ENCOUNTER FOR SCREENING FOR OTHER VIRAL DISEASES: ICD-10-CM

## 2020-11-11 LAB — FIO2-PRE: 36 %

## 2020-11-11 NOTE — Clinical Note
Called patient and he reports this prescription had been written by his dentist following a root canal. Had not been written by LM. Patient will contact pharmacy to work out details.    Angel Augustin RN, BSN       Hi, Another eval--maybe week of 12/7 if can do pH study 12/08, cath on 12/09?  Do class and social work the week before.  Aim for Tuesday 12/01 at 10 am, may need to move if unable to do group class.   Gloria

## 2020-11-11 NOTE — PROGRESS NOTES
Confirmed with Archie that his lab results were reviewed with Dr. Souza who agrees with proceeding with lung transplant evaluation.   To assess whether he can walk using 4 liters pulsed (and try a more portable tank) will schedule 6mw to be done on 4 liters pulsed.     Orders completed for evaluation.

## 2020-11-15 DIAGNOSIS — Z11.59 ENCOUNTER FOR SCREENING FOR OTHER VIRAL DISEASES: Primary | ICD-10-CM

## 2020-11-17 ENCOUNTER — TELEPHONE (OUTPATIENT)
Dept: TRANSPLANT | Facility: CLINIC | Age: 70
End: 2020-11-17

## 2020-11-17 NOTE — TELEPHONE ENCOUNTER
Archie called with some concerns about proceeding with transplant evaluation due to increased Covid incidence.  He wondered about postponing it a month or so.    Discussed that cancelling/postponing is always an option.  However it is unclear whether it would be any better in a month, it may be several months.  Currently the hospital and clinic have made the decision to close both facilities to visitors, which he needs to be aware of as well.  That is unlikely to change for awhile.   Also discussed that we can't predict his disease stability/rate of progression, and it is unknown how he will be in the next few months.     Discussed that ultimately it is his decision whether he wants to cancel/postpone the evaluation.  The decision does not need to be made today if he wants to give it a little more time before he decides.   Archie decided that for now he will plan to proceed as is being scheduled and see what happens as it gets closer to his evaluation dates.

## 2020-11-19 DIAGNOSIS — Z11.59 ENCOUNTER FOR SCREENING FOR OTHER VIRAL DISEASES: Primary | ICD-10-CM

## 2020-12-02 ENCOUNTER — TELEPHONE (OUTPATIENT)
Dept: TRANSPLANT | Facility: CLINIC | Age: 70
End: 2020-12-02

## 2020-12-02 NOTE — TELEPHONE ENCOUNTER
Contacted Eric to review evaluation schedule for next few weeks and to answer any questions.      Explained that Education materials will be sent via [employer].  Requested that patient review contents, but not sign documents until after the transplant class. Recommended that patient and caregiver(s) enroll in [facility] and review Lung Pre-Transplant videos prior to the scheduled lung transplant class.     Confirmed will review the rest of his schedule and answer any questions tomorrow during the lung transplant education class.   No questions at this time.

## 2020-12-03 ENCOUNTER — PATIENT OUTREACH (OUTPATIENT)
Dept: GASTROENTEROLOGY | Facility: CLINIC | Age: 70
End: 2020-12-03

## 2020-12-03 ENCOUNTER — ALLIED HEALTH/NURSE VISIT (OUTPATIENT)
Dept: TRANSPLANT | Facility: CLINIC | Age: 70
End: 2020-12-03
Attending: INTERNAL MEDICINE
Payer: COMMERCIAL

## 2020-12-03 DIAGNOSIS — J84.9 LUNG DISEASE, INTERSTITIAL (H): ICD-10-CM

## 2020-12-03 DIAGNOSIS — J84.9 ILD (INTERSTITIAL LUNG DISEASE) (H): ICD-10-CM

## 2020-12-03 DIAGNOSIS — Z76.82 ORGAN TRANSPLANT CANDIDATE: ICD-10-CM

## 2020-12-03 PROCEDURE — 99207 PR NO CHARGE NURSE ONLY: CPT

## 2020-12-03 NOTE — PROGRESS NOTES
"Patient and spouse Lor attended the pre lung class virtually by video.  Reviewed content from [employer] videos by reviewing outline. Patient had already watched the videos.  They were attentive, stated understanding, and asked good questions. They had received all relevant handouts by [employer].   Verified that patient has received the following items:      \"Questions and Answers for Transplant Candidates and Families about Multiple Listing Waiting Time Transfer\"       One-Year Survival Rates for Heart and Lung Transplant  for [facility].      Reviewed the following documents with the patient:      \"Guidelines for Being on the Transplant List\".      \" What You Need to Know about a Lung and Heart-Lung Transplant .      Confirmed that the Thoracic Transplant Patient Handbook is available on the [email or web address] website to review.     Required signatures obtained via [employer] and forms will be scanned to EMR.  Addressed patient questions and concerns regarding transplant.    Discussed donor offers including increased risk, and DCD donors.     Discussed physician and coordinator management pre to post lung transplant.     HLA results pending.  Discussed PRA monitoring with patient.     Will review with Lung Transplant Team for transplant candidacy when evaluation complete.      "

## 2020-12-04 ENCOUNTER — TELEPHONE (OUTPATIENT)
Dept: TRANSPLANT | Facility: CLINIC | Age: 70
End: 2020-12-04

## 2020-12-04 NOTE — TELEPHONE ENCOUNTER
Archie returned by call from last evening.  Confirmed with Archie that he should bring his portable oxygen along to clinic on Monday because the request for his 6 mw is that he use his own oxygen at 4 liters pulsed to verify whether this is adequate for him.   Also confirmed that the prescreening Covid test has been linked to both his pH study and heart cath next week so it should be resulted in time.   Re-confirmed change in schedule for his Social Work consult.  It may be tight with completing his pH study--just log in when able to.   No further questions.

## 2020-12-06 ENCOUNTER — HOSPITAL ENCOUNTER (OUTPATIENT)
Dept: LAB | Facility: CLINIC | Age: 70
Discharge: HOME | End: 2020-12-06
Attending: INTERNAL MEDICINE | Admitting: INTERNAL MEDICINE
Payer: MEDICARE

## 2020-12-06 DIAGNOSIS — Z11.59 ENCOUNTER FOR SCREENING FOR OTHER VIRAL DISEASES: ICD-10-CM

## 2020-12-06 PROCEDURE — U0003 INFECTIOUS AGENT DETECTION BY NUCLEIC ACID (DNA OR RNA); SEVERE ACUTE RESPIRATORY SYNDROME CORONAVIRUS 2 (SARS-COV-2) (CORONAVIRUS DISEASE [COVID-19]), AMPLIFIED PROBE TECHNIQUE, MAKING USE OF HIGH THROUGHPUT TECHNOLOGIES AS DESCRIBED BY CMS-2020-01-R: HCPCS | Performed by: INTERNAL MEDICINE

## 2020-12-07 ENCOUNTER — ANCILLARY PROCEDURE (OUTPATIENT)
Dept: CT IMAGING | Facility: CLINIC | Age: 70
End: 2020-12-07
Attending: INTERNAL MEDICINE
Payer: MEDICARE

## 2020-12-07 ENCOUNTER — ANCILLARY PROCEDURE (OUTPATIENT)
Dept: GENERAL RADIOLOGY | Facility: CLINIC | Age: 70
End: 2020-12-07
Attending: INTERNAL MEDICINE
Payer: MEDICARE

## 2020-12-07 ENCOUNTER — ANCILLARY PROCEDURE (OUTPATIENT)
Dept: BONE DENSITY | Facility: CLINIC | Age: 70
End: 2020-12-07
Attending: INTERNAL MEDICINE
Payer: MEDICARE

## 2020-12-07 ENCOUNTER — ANCILLARY PROCEDURE (OUTPATIENT)
Dept: CARDIOLOGY | Facility: CLINIC | Age: 70
End: 2020-12-07
Attending: INTERNAL MEDICINE
Payer: MEDICARE

## 2020-12-07 DIAGNOSIS — J84.9 LUNG DISEASE, INTERSTITIAL (H): ICD-10-CM

## 2020-12-07 DIAGNOSIS — Z79.52 LONG TERM (CURRENT) USE OF SYSTEMIC STEROIDS: ICD-10-CM

## 2020-12-07 DIAGNOSIS — R06.00 DYSPNEA: ICD-10-CM

## 2020-12-07 DIAGNOSIS — Z12.5 ENCOUNTER FOR SCREENING FOR MALIGNANT NEOPLASM OF PROSTATE: ICD-10-CM

## 2020-12-07 DIAGNOSIS — Z76.82 ORGAN TRANSPLANT CANDIDATE: ICD-10-CM

## 2020-12-07 DIAGNOSIS — R06.02 SHORTNESS OF BREATH: ICD-10-CM

## 2020-12-07 DIAGNOSIS — Z11.59 ENCOUNTER FOR SCREENING FOR OTHER VIRAL DISEASES: ICD-10-CM

## 2020-12-07 DIAGNOSIS — J84.9 ILD (INTERSTITIAL LUNG DISEASE) (H): ICD-10-CM

## 2020-12-07 DIAGNOSIS — Z13.6 ENCOUNTER FOR SCREENING FOR CARDIOVASCULAR DISORDERS: ICD-10-CM

## 2020-12-07 DIAGNOSIS — R06.00 DYSPNEA, UNSPECIFIED: ICD-10-CM

## 2020-12-07 DIAGNOSIS — Z79.899 OTHER LONG TERM (CURRENT) DRUG THERAPY: ICD-10-CM

## 2020-12-07 LAB
6 MIN WALK (FT): 643 FT
6 MIN WALK (M): 196 M
ABO + RH BLD: NORMAL
ALBUMIN SERPL-MCNC: 3.8 G/DL (ref 3.4–5)
ALBUMIN UR-MCNC: NEGATIVE MG/DL
ALP SERPL-CCNC: 60 U/L (ref 40–150)
ALT SERPL W P-5'-P-CCNC: 19 U/L (ref 0–70)
AMYLASE SERPL-CCNC: 81 U/L (ref 30–110)
ANION GAP SERPL CALCULATED.3IONS-SCNC: 5 MMOL/L (ref 3–14)
APPEARANCE UR: CLEAR
APTT PPP: 27 SEC (ref 22–37)
AST SERPL W P-5'-P-CCNC: 14 U/L (ref 0–45)
BASE EXCESS BLDV CALC-SCNC: 6.9 MMOL/L
BASOPHILS # BLD AUTO: 0.1 10E9/L (ref 0–0.2)
BASOPHILS NFR BLD AUTO: 1 %
BILIRUB SERPL-MCNC: 0.8 MG/DL (ref 0.2–1.3)
BILIRUB UR QL STRIP: NEGATIVE
BLD GP AB SCN SERPL QL: NORMAL
BLOOD BANK CMNT PATIENT-IMP: NORMAL
BUN SERPL-MCNC: 23 MG/DL (ref 7–30)
CALCIUM SERPL-MCNC: 9 MG/DL (ref 8.5–10.1)
CHLORIDE SERPL-SCNC: 103 MMOL/L (ref 94–109)
CHOLEST SERPL-MCNC: 235 MG/DL
CMV IGG SERPL QL IA: 0.6 AI (ref 0–0.8)
CO2 SERPL-SCNC: 32 MMOL/L (ref 20–32)
COLOR UR AUTO: YELLOW
CREAT SERPL-MCNC: 1.21 MG/DL (ref 0.66–1.25)
DEPRECATED CALCIDIOL+CALCIFEROL SERPL-MC: 59 UG/L (ref 20–75)
DIFFERENTIAL METHOD BLD: ABNORMAL
DLCOUNC-%PRED-PRE: 35 %
DLCOUNC-PRE: 9.49 ML/MIN/MMHG
DLCOUNC-PRED: 26.4 ML/MIN/MMHG
EBV VCA IGG SER QL IA: 7.8 AI (ref 0–0.8)
EOSINOPHIL # BLD AUTO: 0.3 10E9/L (ref 0–0.7)
EOSINOPHIL NFR BLD AUTO: 2.3 %
ERV-%PRED-PRE: 34 %
ERV-PRE: 0.51 L
ERV-PRED: 1.48 L
ERYTHROCYTE [DISTWIDTH] IN BLOOD BY AUTOMATED COUNT: 12.7 % (ref 10–15)
EXPTIME-PRE: 4.74 SEC
FEF2575-%PRED-POST: 40 %
FEF2575-%PRED-PRE: 31 %
FEF2575-POST: 0.92 L/SEC
FEF2575-PRE: 0.73 L/SEC
FEF2575-PRED: 2.29 L/SEC
FEFMAX-%PRED-PRE: 56 %
FEFMAX-PRE: 4.75 L/SEC
FEFMAX-PRED: 8.38 L/SEC
FEV1-%PRED-PRE: 34 %
FEV1-PRE: 1.04 L
FEV1FEV6-PRE: 75 %
FEV1FEV6-PRED: 77 %
FEV1FVC-PRE: 76 %
FEV1FVC-PRED: 76 %
FEV1SVC-PRE: 72 %
FEV1SVC-PRED: 62 %
FIFMAX-PRE: 2.67 L/SEC
FRCPLETH-%PRED-PRE: 39 %
FRCPLETH-PRE: 1.51 L
FRCPLETH-PRED: 3.78 L
FVC-%PRED-PRE: 34 %
FVC-PRE: 1.37 L
FVC-PRED: 3.98 L
GFR SERPL CREATININE-BSD FRML MDRD: 59 ML/MIN/{1.73_M2}
GLUCOSE SERPL-MCNC: 91 MG/DL (ref 70–99)
GLUCOSE UR STRIP-MCNC: NEGATIVE MG/DL
GRAM STN SPEC: NORMAL
HAV IGG SER QL IA: NONREACTIVE
HBV CORE AB SERPL QL IA: NONREACTIVE
HBV SURFACE AB SERPL IA-ACNC: 0.64 M[IU]/ML
HBV SURFACE AG SERPL QL IA: NONREACTIVE
HCO3 BLDV-SCNC: 36 MMOL/L (ref 21–28)
HCT VFR BLD AUTO: 52.4 % (ref 40–53)
HCV AB SERPL QL IA: NONREACTIVE
HDLC SERPL-MCNC: 74 MG/DL
HGB BLD-MCNC: 16.8 G/DL (ref 13.3–17.7)
HGB UR QL STRIP: NEGATIVE
HIV 1+2 AB+HIV1 P24 AG SERPL QL IA: NONREACTIVE
HSV1 IGG SERPL QL IA: >8 AI (ref 0–0.8)
HSV2 IGG SERPL QL IA: <0.2 AI (ref 0–0.8)
IC-%PRED-PRE: 27 %
IC-PRE: 0.93 L
IC-PRED: 3.41 L
IGA SERPL-MCNC: 365 MG/DL (ref 84–499)
IGG SERPL-MCNC: 851 MG/DL (ref 610–1616)
IGG1 SER-MCNC: 433 MG/DL (ref 382–929)
IGG2 SER-MCNC: 322 MG/DL (ref 242–700)
IGG3 SER-MCNC: 39 MG/DL (ref 22–176)
IGG4 SER-MCNC: 31 MG/DL (ref 4–86)
IGM SERPL-MCNC: 143 MG/DL (ref 35–242)
IMM GRANULOCYTES # BLD: 0.1 10E9/L (ref 0–0.4)
IMM GRANULOCYTES NFR BLD: 0.4 %
INR PPP: 0.98 (ref 0.86–1.14)
KETONES UR STRIP-MCNC: NEGATIVE MG/DL
LABORATORY COMMENT REPORT: NORMAL
LDLC SERPL CALC-MCNC: 125 MG/DL
LEUKOCYTE ESTERASE UR QL STRIP: NEGATIVE
LYMPHOCYTES # BLD AUTO: 1.5 10E9/L (ref 0.8–5.3)
LYMPHOCYTES NFR BLD AUTO: 13.5 %
Lab: NORMAL
MAGNESIUM SERPL-MCNC: 2.3 MG/DL (ref 1.6–2.3)
MCH RBC QN AUTO: 32.5 PG (ref 26.5–33)
MCHC RBC AUTO-ENTMCNC: 32.1 G/DL (ref 31.5–36.5)
MCV RBC AUTO: 101 FL (ref 78–100)
MONOCYTES # BLD AUTO: 1.2 10E9/L (ref 0–1.3)
MONOCYTES NFR BLD AUTO: 10.7 %
MUCOUS THREADS #/AREA URNS LPF: PRESENT /LPF
NEUTROPHILS # BLD AUTO: 8 10E9/L (ref 1.6–8.3)
NEUTROPHILS NFR BLD AUTO: 72.1 %
NITRATE UR QL: NEGATIVE
NONHDLC SERPL-MCNC: 161 MG/DL
NRBC # BLD AUTO: 0 10*3/UL
NRBC BLD AUTO-RTO: 0 /100
O2/TOTAL GAS SETTING VFR VENT: ABNORMAL %
OXYHGB MFR BLDV: 38 %
PCO2 BLDV: 67 MM HG (ref 40–50)
PH BLDV: 7.34 PH (ref 7.32–7.43)
PH UR STRIP: 6 PH (ref 5–7)
PHOSPHATE SERPL-MCNC: 3 MG/DL (ref 2.5–4.5)
PLATELET # BLD AUTO: 236 10E9/L (ref 150–450)
PO2 BLDV: 24 MM HG (ref 25–47)
POTASSIUM SERPL-SCNC: 3.7 MMOL/L (ref 3.4–5.3)
PREALB SERPL IA-MCNC: 28 MG/DL (ref 15–45)
PROT SERPL-MCNC: 7.4 G/DL (ref 6.8–8.8)
PSA SERPL-ACNC: 3.86 UG/L (ref 0–4)
RBC # BLD AUTO: 5.17 10E12/L (ref 4.4–5.9)
RBC #/AREA URNS AUTO: 2 /HPF (ref 0–2)
RVPLETH-%PRED-PRE: 36 %
RVPLETH-PRE: 1 L
RVPLETH-PRED: 2.72 L
SARS-COV-2 RNA SPEC QL NAA+PROBE: NEGATIVE
SARS-COV-2 RNA SPEC QL NAA+PROBE: NORMAL
SODIUM SERPL-SCNC: 140 MMOL/L (ref 133–144)
SOURCE: ABNORMAL
SP GR UR STRIP: 1.02 (ref 1–1.03)
SPECIMEN EXP DATE BLD: NORMAL
SPECIMEN EXP DATE BLD: NORMAL
SPECIMEN SOURCE: NORMAL
SQUAMOUS #/AREA URNS AUTO: <1 /HPF (ref 0–1)
T GONDII IGG SER QL: 47.1 IU/ML (ref 0–7.1)
T4 FREE SERPL-MCNC: 1.01 NG/DL (ref 0.76–1.46)
TLCPLETH-%PRED-PRE: 33 %
TLCPLETH-PRE: 2.44 L
TLCPLETH-PRED: 7.33 L
TRIGL SERPL-MCNC: 179 MG/DL
TSH SERPL DL<=0.005 MIU/L-ACNC: 6.56 MU/L (ref 0.4–4)
UROBILINOGEN UR STRIP-MCNC: 0 MG/DL (ref 0–2)
VA-%PRED-PRE: 33 %
VA-PRE: 2.22 L
VC-%PRED-PRE: 29 %
VC-PRE: 1.45 L
VC-PRED: 4.89 L
VZV IGG SER QL IA: 5.9 AI (ref 0–0.8)
WBC # BLD AUTO: 11.1 10E9/L (ref 4–11)
WBC #/AREA URNS AUTO: 1 /HPF (ref 0–5)

## 2020-12-07 PROCEDURE — 87106 FUNGI IDENTIFICATION YEAST: CPT | Mod: 90 | Performed by: PATHOLOGY

## 2020-12-07 PROCEDURE — 76000 FLUOROSCOPY <1 HR PHYS/QHP: CPT | Mod: GC | Performed by: RADIOLOGY

## 2020-12-07 PROCEDURE — 81001 URINALYSIS AUTO W/SCOPE: CPT | Performed by: PATHOLOGY

## 2020-12-07 PROCEDURE — 36415 COLL VENOUS BLD VENIPUNCTURE: CPT | Performed by: PATHOLOGY

## 2020-12-07 PROCEDURE — 77080 DXA BONE DENSITY AXIAL: CPT | Performed by: INTERNAL MEDICINE

## 2020-12-07 PROCEDURE — 72100 X-RAY EXAM L-S SPINE 2/3 VWS: CPT | Performed by: RADIOLOGY

## 2020-12-07 PROCEDURE — 94060 EVALUATION OF WHEEZING: CPT | Performed by: INTERNAL MEDICINE

## 2020-12-07 PROCEDURE — 73523 X-RAY EXAM HIPS BI 5/> VIEWS: CPT | Performed by: RADIOLOGY

## 2020-12-07 PROCEDURE — 85730 THROMBOPLASTIN TIME PARTIAL: CPT | Performed by: PATHOLOGY

## 2020-12-07 PROCEDURE — 72070 X-RAY EXAM THORAC SPINE 2VWS: CPT | Performed by: STUDENT IN AN ORGANIZED HEALTH CARE EDUCATION/TRAINING PROGRAM

## 2020-12-07 PROCEDURE — 71046 X-RAY EXAM CHEST 2 VIEWS: CPT | Mod: GC | Performed by: RADIOLOGY

## 2020-12-07 PROCEDURE — 84439 ASSAY OF FREE THYROXINE: CPT | Performed by: PATHOLOGY

## 2020-12-07 PROCEDURE — 93306 TTE W/DOPPLER COMPLETE: CPT | Performed by: INTERNAL MEDICINE

## 2020-12-07 PROCEDURE — 71250 CT THORAX DX C-: CPT | Mod: GC | Performed by: RADIOLOGY

## 2020-12-07 PROCEDURE — 94618 PULMONARY STRESS TESTING: CPT | Performed by: INTERNAL MEDICINE

## 2020-12-07 PROCEDURE — 87116 MYCOBACTERIA CULTURE: CPT | Performed by: PATHOLOGY

## 2020-12-07 PROCEDURE — 82274 ASSAY TEST FOR BLOOD FECAL: CPT | Performed by: INTERNAL MEDICINE

## 2020-12-07 PROCEDURE — 82805 BLOOD GASES W/O2 SATURATION: CPT | Performed by: PATHOLOGY

## 2020-12-07 PROCEDURE — 85025 COMPLETE CBC W/AUTO DIFF WBC: CPT | Performed by: PATHOLOGY

## 2020-12-07 PROCEDURE — 85610 PROTHROMBIN TIME: CPT | Performed by: PATHOLOGY

## 2020-12-07 PROCEDURE — 94729 DIFFUSING CAPACITY: CPT | Performed by: INTERNAL MEDICINE

## 2020-12-07 PROCEDURE — 94726 PLETHYSMOGRAPHY LUNG VOLUMES: CPT | Performed by: INTERNAL MEDICINE

## 2020-12-07 RX ORDER — ACYCLOVIR 200 MG/1
10 CAPSULE ORAL ONCE
Status: COMPLETED | OUTPATIENT
Start: 2020-12-07 | End: 2020-12-07

## 2020-12-07 RX ADMIN — ACYCLOVIR 10 ML: 200 CAPSULE ORAL at 13:16

## 2020-12-08 ENCOUNTER — VIRTUAL VISIT (OUTPATIENT)
Dept: TRANSPLANT | Facility: CLINIC | Age: 70
End: 2020-12-08
Attending: INTERNAL MEDICINE
Payer: MEDICARE

## 2020-12-08 ENCOUNTER — OFFICE VISIT (OUTPATIENT)
Dept: GASTROENTEROLOGY | Facility: CLINIC | Age: 70
End: 2020-12-08
Attending: INTERNAL MEDICINE
Payer: MEDICARE

## 2020-12-08 ENCOUNTER — TELEPHONE (OUTPATIENT)
Dept: CARDIOLOGY | Facility: CLINIC | Age: 70
End: 2020-12-08

## 2020-12-08 VITALS
SYSTOLIC BLOOD PRESSURE: 134 MMHG | OXYGEN SATURATION: 99 % | DIASTOLIC BLOOD PRESSURE: 85 MMHG | HEART RATE: 73 BPM | BODY MASS INDEX: 21.55 KG/M2 | WEIGHT: 153.9 LBS | HEIGHT: 71 IN | TEMPERATURE: 97.9 F

## 2020-12-08 DIAGNOSIS — J84.9 LUNG DISEASE, INTERSTITIAL (H): ICD-10-CM

## 2020-12-08 DIAGNOSIS — Z76.82 ORGAN TRANSPLANT CANDIDATE: Primary | ICD-10-CM

## 2020-12-08 DIAGNOSIS — Z76.82 ORGAN TRANSPLANT CANDIDATE: ICD-10-CM

## 2020-12-08 DIAGNOSIS — J84.9 ILD (INTERSTITIAL LUNG DISEASE) (H): Primary | ICD-10-CM

## 2020-12-08 DIAGNOSIS — I25.10 CORONARY ATHEROSCLEROSIS OF NATIVE CORONARY VESSEL: ICD-10-CM

## 2020-12-08 LAB
BLD GP AB SCN TITR SERPL: NORMAL {TITER}
GAMMA INTERFERON BACKGROUND BLD IA-ACNC: 0.05 IU/ML
M TB IFN-G CD4+ BCKGRND COR BLD-ACNC: 9.95 IU/ML
M TB TUBERC IFN-G BLD QL: NEGATIVE
MITOGEN IGNF BCKGRD COR BLD-ACNC: 0 IU/ML
MITOGEN IGNF BCKGRD COR BLD-ACNC: 0.01 IU/ML

## 2020-12-08 PROCEDURE — 91010 ESOPHAGUS MOTILITY STUDY: CPT

## 2020-12-08 PROCEDURE — 91038 ESOPH IMPED FUNCT TEST > 1HR: CPT

## 2020-12-08 PROCEDURE — 99207 PR NO CHARGE NURSE ONLY: CPT

## 2020-12-08 RX ORDER — POTASSIUM CHLORIDE 1500 MG/1
20 TABLET, EXTENDED RELEASE ORAL
Status: CANCELLED | OUTPATIENT
Start: 2020-12-08

## 2020-12-08 RX ORDER — LIDOCAINE 40 MG/G
CREAM TOPICAL
Status: CANCELLED | OUTPATIENT
Start: 2020-12-08

## 2020-12-08 RX ORDER — SODIUM CHLORIDE 9 MG/ML
INJECTION, SOLUTION INTRAVENOUS CONTINUOUS
Status: CANCELLED | OUTPATIENT
Start: 2020-12-08

## 2020-12-08 RX ORDER — POTASSIUM CHLORIDE 1500 MG/1
40 TABLET, EXTENDED RELEASE ORAL
Status: CANCELLED | OUTPATIENT
Start: 2020-12-08

## 2020-12-08 ASSESSMENT — MIFFLIN-ST. JEOR: SCORE: 1470.22

## 2020-12-08 NOTE — LETTER
12/8/2020         RE: Eric Victor  [address]  [facility] [address]        Dear Colleague,    Thank you for referring your patient, Eric Victor, to the [facility] TRANSPL[facility]. Please see a copy of my visit note below.    Patient of Dr. Souza seen via video visit today for psychosocial assessment.   60 minutes spent with patient. 100% of visit consisted of counseling related to issues surrounding IPF and lung transplantation.    Psychosocial Assessment   Name: Eric Victor     MRN:  [id number]        Patient Name / Age / Race: Eric Victor/ 70 year old/    Source of Information: Patient and spouse, Lor   : Robert Smith [facility]   Interview Date: December 8, 2020   Reason for Referral: Lung Transplant     Current Living Situation   Location (City/State): [address]  [facility] [address]   With Whom: With wife, Lor (70)    Type of Home: single family, 2 story with basement.  Bedrooms up full flight of stairs.     Working Phone? Yes    Three Pronged Outlet? n/a   Distance from Hospital: local   Need to Relocate Post Surgery? No   Discussed? Yes      Vocational/Employment/Financial   Employment: Retired, 8 years ago   Occupation:    Education: B.S.    [place]? No   Income: SSD, pension and savings   Insurance: Medicare and Medicare Supplement   Name of Private Insurance: [employer] -full medicare supplement   Medication Coverage: Medicare Part D through silver scripts    In current situation, pt. can afford medication and supply costs:  Yes    Other Financial Concerns/Issues: Deny concerns.  Could budget $10,000 out of pocket each year for medications if needed.       Family/Social Support   Marital Status:    Partner Name: Lor   Length of Time : 49 years   Partner s Employment: Retired.   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     Relationship: stable/supportive   Children: 3 adult sons.  Galo (44) lives in [place].  Jasiel (42) lives in [place].  Shahram (40) lives in [place]. All . 9 grandchildren ages 1-10   Parents:    Siblings: One brother.  Estranged.     Other Family or Friends Close by: Neighbors, couple close friends.     Support System: available, helpful   Primary Support Person: Spouse, Lor   Issues: Spouse retired and available as needed.  Sons and Daughter in laws can provide some respite. Provided with caregiver information sheet and caregiver agreement.      Activities/Functional Ability   Current Level: Ambulatory, independent in ADLs   Daily Activities: Does light housekeeping, takes a daily walk for at least 30 minutes.     Transportation: self      Medical Status   Patient s understanding of need for surgical intervention: Has good understanding that there are risks, but very hopeful he will have a good outcome.  I want 'more quality time' with my family.     Advanced Directive? Yes     Details: appointed spouse.  Wife has copy.  A little reluctant to put in on file in case he wants to make changes to it.  Education provided.     Adherence History: Is reluctant to wear oxygen at all times as prescribed.  Otherwise very adherent   Ability to Adhere to Complex Medical Regime: No concerns.       Behavioral   Chemical Dependency Issues: No.  In past, might have one beer a day.  (prior to illness beginning 10 years ago.) Now, might have 1-2 beers per month at most.   Discussed need for abstinence post transplant. Patient agreeable. Does not think this will be problematic.        Smoker? No  Never smoked or used any nicotine products.     Psychiatric impairment: No, negative mental health hx.  Denies symptoms of depression or anxiety.  No hx of mental health services   Coping Style/Strategies: 'I'm a problem solver'  Tackles whatever is in front of him and solves the problem.     Recent Legal History: No    Teaching Completed During Assessment Related To Transplant: 1. Housing and relocation needs post surgery.  2. Caregiver needs post surgery.  3. Financial issues related to surgery.  4. Risks of alcohol use post surgery.  5. Common psychosocial stressors pre/post surgery.  6. Support group availability.   Psychosocial Risks Reviewed Related To Transplant: 1. Increased stress related to your emotional, family, social, employment, or financial situation.  2. Affect on work and/or disability benefits.  3. Affect on future health and life insurance.  4. Outcome expectations may not be met.  5. Mental Health risks: anxiety, depression, PTSD, guilt, grief and chronic fatigue.     Observed Behavior   Well informed? Yes  Angry? No   Process info well? Yes  Hostile? No   Evasive? No Oriented X3? Yes    Cautious/Suspicious? No Motivated? Yes    Appropriate Behavior? Yes  Depressed? No   Appropriate Affect? Yes  Anxious? No   Interview Observations: Asks very good questions.  Is thinking hard about whether lung transplant is right for him, but as he gets sicker he doesn't feel like there is much choice.  Highly motivated.  Smart.     Selection Criteria Met   Plan for Support Yes    Chemical Dependence Yes    Smoking Yes    Mental Health Yes    Adequate Finances Yes      Risk Issues:    None identified.      Final Evaluation/Assessment:     Other than advanced age, patient appears to be good candidate from a psychosocial perspective.  No financial or insurance concerns.  No CD or psychiatric issues.  He has excellent support and good problem solving skills. Discussed impact age could have on his rehab and length of stay.  He verbalizes good understanding.      Patient understands risks and benefits of Lung Transplant: Yes     Recommendation:    good    Signature: Robert Smith [facility]     Title: Licensed Independent Clinical                Interview Date: December 8, 2020          Again, thank you for  allowing me to participate in the care of your patient.        Sincerely,        Robert Smith, SANDROSW

## 2020-12-08 NOTE — PATIENT INSTRUCTIONS
Esophogeal Motility with pH Impedence  1. Resume regular diet.  2. You may have a bloody nose or sore throat after the procedure.  3. You had a 24-hour probe placed, return the next day to have the probe removed.  Removal will take 5 minutes.  4. If you have questions call [phone] from 7:00am-5:00pm.  For afterhours questions call GI doctor on call at [phone].

## 2020-12-08 NOTE — PROGRESS NOTES
Met with Archie and his wife Lor by video to discuss the coronary angiogram/right heart cath scheduled for tomorrow. Will check in at 10:30 am to [facility] for perfusion scan, to be followed by heart cath.  Reviewed rationale,  procedure and post care. Provided Coronary Angiogram booklet by email.  Instructed pt to take 325 mg ASA the night before and morning of procedure, per Heart Cath Lab protocol.  K+ 3.7 (low normal). Creatinine slightly elevated at 1.21, GFR 59.  Encouraged Archie to drink fluids today and following procedure tomorrow.  Instructed patient not to eat or drink after midnight, but could take meds in AM with sips clear liquids.  His wife will be available to drive and care for patient after procedure.    Pre-procedure heart cath orders have been completed.  Yes

## 2020-12-08 NOTE — PROGRESS NOTES
Patient of Dr. Souza seen via video visit today for psychosocial assessment.   60 minutes spent with patient. 100% of visit consisted of counseling related to issues surrounding IPF and lung transplantation.    Psychosocial Assessment   Name: Eric Victor     MRN:  [id number]        Patient Name / Age / Race: Eric Victor/ 70 year old/    Source of Information: Patient and spouse, Lor   : [facility]   Interview Date: 2020   Reason for Referral: Lung Transplant     Current Living Situation   Location ([facility]): [address] [place] [id number]   With Whom: With wife, Lor (72)    Type of Home: single family, 2 story with basement.  Bedrooms up full flight of stairs.     Working Phone? Yes    Three Pronged Outlet? n/a   Distance from Hospital: local   Need to Relocate Post Surgery? No   Discussed? Yes      Vocational/Employment/Financial   Employment: Retired, 8 years ago   Occupation:    Education: B.S.    [place]? No   Income: SSD, pension and savings   Insurance: Medicare and Medicare Supplement   Name of Private Insurance: [employer] -full medicare supplement   Medication Coverage: Medicare Part D through silver scripts    In current situation, pt. can afford medication and supply costs:  Yes    Other Financial Concerns/Issues: Deny concerns.  Could budget $10,000 out of pocket each year for medications if needed.       Family/Social Support   Marital Status:    Partner Name: Lor   Length of Time : 49 years   Partner s Employment: Retired.   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    Relationship: stable/supportive   Children: 3 adult sons.  Galo (44) lives in [place].  Jasiel (42) lives in [place].  Shahram (40) lives in [place]. All . 9 grandchildren ages 1-10   Parents:    Siblings: One brother.  Estranged.     Other Family or  Friends Close by: Neighbors, couple close friends.     Support System: available, helpful   Primary Support Person: Spouse, Lor   Issues: Spouse retired and available as needed.  Sons and Daughter in laws can provide some respite. Provided with caregiver information sheet and caregiver agreement.      Activities/Functional Ability   Current Level: Ambulatory, independent in ADLs   Daily Activities: Does light housekeeping, takes a daily walk for at least 30 minutes.     Transportation: self      Medical Status   Patient s understanding of need for surgical intervention: Has good understanding that there are risks, but very hopeful he will have a good outcome.  I want 'more quality time' with my family.     Advanced Directive? Yes     Details: appointed spouse.  Wife has copy.  A little reluctant to put in on file in case he wants to make changes to it.  Education provided.     Adherence History: Is reluctant to wear oxygen at all times as prescribed.  Otherwise very adherent   Ability to Adhere to Complex Medical Regime: No concerns.       Behavioral   Chemical Dependency Issues: No.  In past, might have one beer a day.  (prior to illness beginning 10 years ago.) Now, might have 1-2 beers per month at most.   Discussed need for abstinence post transplant. Patient agreeable. Does not think this will be problematic.        Smoker? No  Never smoked or used any nicotine products.     Psychiatric impairment: No, negative mental health hx.  Denies symptoms of depression or anxiety.  No hx of mental health services   Coping Style/Strategies: 'I'm a problem solver'  Tackles whatever is in front of him and solves the problem.     Recent Legal History: No   Teaching Completed During Assessment Related To Transplant: 1. Housing and relocation needs post surgery.  2. Caregiver needs post surgery.  3. Financial issues related to surgery.  4. Risks of alcohol use post surgery.  5. Common psychosocial stressors pre/post  surgery.  6. Support group availability.   Psychosocial Risks Reviewed Related To Transplant: 1. Increased stress related to your emotional, family, social, employment, or financial situation.  2. Affect on work and/or disability benefits.  3. Affect on future health and life insurance.  4. Outcome expectations may not be met.  5. Mental Health risks: anxiety, depression, PTSD, guilt, grief and chronic fatigue.     Observed Behavior   Well informed? Yes  Angry? No   Process info well? Yes  Hostile? No   Evasive? No Oriented X3? Yes    Cautious/Suspicious? No Motivated? Yes    Appropriate Behavior? Yes  Depressed? No   Appropriate Affect? Yes  Anxious? No   Interview Observations: Asks very good questions.  Is thinking hard about whether lung transplant is right for him, but as he gets sicker he doesn't feel like there is much choice.  Highly motivated.  Smart.     Selection Criteria Met   Plan for Support Yes    Chemical Dependence Yes    Smoking Yes    Mental Health Yes    Adequate Finances Yes      Risk Issues:    None identified.      Final Evaluation/Assessment:     Other than advanced age, patient appears to be good candidate from a psychosocial perspective.  No financial or insurance concerns.  No CD or psychiatric issues.  He has excellent support and good problem solving skills. Discussed impact age could have on his rehab and length of stay.  He verbalizes good understanding.      Patient understands risks and benefits of Lung Transplant: Yes     Recommendation:    good    Signature: TAMRA Vargas     Title: Licensed Independent Clinical                Interview Date: December 8, 2020

## 2020-12-08 NOTE — PROGRESS NOTES
"Non-Invasive GI Procedure Visit    Eric Victor is a 70 year old male with history of    Organ transplant candidate  Lung disease, interstitial (H).   Patient stated reason for procedure: Lung Transplant Evaluation  COVID-19 Test Performed within 48-72hrs of the procedure:  Yes. COVID-19 result was NEGATIVE.    COVID-19 PCR Results    COVID-19 PCR Results 12/6/20 12/6/20    1421 1421   COVID-19 Virus PCR to [facility] - Result Test received-See reflex to IDDL test SARS CoV2 (COVID-19) Virus RT-PCR    COVID-19 Virus PCR to [facility] - Source Nasopharyngeal    SARS-CoV-2 Virus Specimen Source  Nasopharyngeal   SARS-CoV-2 PCR Result  NEGATIVE      Comments are available for some flowsheets but are not being displayed.         COVID-19 Antibody Results, Testing for Immunity    COVID-19 Antibody Results, Testing for Immunity   No data to display.             Pre-Procedure Assessment  Patient presents to clinic today for Esophageal Motility Study with pH Impedance    Referring Provider: Dr. Dorita Souza  Does patient report taking a PPI (omeprazole, pantoprazole, rabeprazole, lansoprazole, esomeprazole, dexlansoprazole)? No  Does patient report taking a H2 blocker (ranitidine, or famotidine)? No  Does patient report taking opioids? No  Patient reported that last food and/or drink was last consumed 13 hours ago.  .    Patient Hx  Patient's history, medications and allergies were reviewed.   Patient has not undergone previous endoscopy.    Height: 5' 11\"   Weight: 153 lbs 14.4 oz    Patient Active Problem List    Diagnosis Date Noted     Abnormal CT of the chest 11/11/2020     Priority: Medium     Added automatically from request for surgery [id number]       Coronary atherosclerosis of native coronary vessel 11/11/2020     Priority: Medium     Added automatically from request for surgery [id number]       Organ transplant candidate 11/11/2020     Priority: Medium     Added automatically from request for surgery [id number]       Lung " disease, interstitial (H) 11/11/2020     Priority: Medium     Added automatically from request for surgery [id number]       Raynaud's disease without gangrene 02/08/2017     Priority: Medium     Mild dilation of ascending aorta (H) 11/21/2016     Priority: Medium     4/12/16 Echo  4.12 cm       ILD (interstitial lung disease) (H) 12/06/2012     Priority: Medium     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       History of atrial fibrillation 10/21/2009     Priority: Medium      Prior to Admission medications    Medication Sig Start Date End Date Taking? Authorizing Provider   calcium carbonate 600 mg-vitamin D 400 units (CALTRATE) 600-400 MG-UNIT per tablet Take 1 tablet by mouth daily   Yes Reported, Patient   cholecalciferol 25 MCG (1000 UT) TABS Take 5,000 Units by mouth daily   Yes Reported, Patient   nintedanib (OFEV) 100 MG capsule Take 100 mg by mouth daily   Yes Reported, Patient   predniSONE (DELTASONE) 5 MG tablet Take 1 tablet by mouth daily.   Yes Reported, Patient     No Known Allergies  Past Medical History:   Diagnosis Date     Coronary atherosclerosis of native coronary vessel 11/11/2020     History of blood transfusion 2009    [facility]     Uncomplicated asthma      Past Surgical History:   Procedure Laterality Date     BIOPSY  2018    colon polyp, 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    Procedure Documentation: GI Esophageal Motility with 24 Hour Ph    Motility catheter was placed via right nare to 51 cm and normal saline swallows given per protocol. Motility catheter was removed at the end of test and the pH cathter was placed via right nare to 36 cm.      Diary and discharge instructions given to patient and patent instructed to return tomorrow for catheter removal.    Notification of pending test results sent to provider for interpretation. Please reference scanned document for final interpretation of results. Patient will follow up with referring provider for test results.    Radha Magaña RN on 12/8/2020 at 11:11 AM

## 2020-12-08 NOTE — LETTER
"  12/8/2020       RE: Eric Victor  [address]  [facility] [address]      Dear Colleague,    Thank you for referring your patient, Eric Victor, to the [facility] GASTRO PROCEDURE [place]. Please see a copy of my visit note below.    Non-Invasive GI Procedure Visit    Eric Victor is a 70 year old male with history of    Organ transplant candidate  Lung disease, interstitial (H).   Patient stated reason for procedure: Lung Transplant Evaluation  COVID-19 Test Performed within 48-72hrs of the procedure:  Yes. COVID-19 result was NEGATIVE.    COVID-19 PCR Results    COVID-19 PCR Results 12/6/20 12/6/20    1421 1421   COVID-19 Virus PCR to [facility] - Result Test received-See reflex to IDDL test SARS CoV2 (COVID-19) Virus RT-PCR    COVID-19 Virus PCR to [facility] - Source Nasopharyngeal    SARS-CoV-2 Virus Specimen Source  Nasopharyngeal   SARS-CoV-2 PCR Result  NEGATIVE      Comments are available for some flowsheets but are not being displayed.         COVID-19 Antibody Results, Testing for Immunity    COVID-19 Antibody Results, Testing for Immunity   No data to display.             Pre-Procedure Assessment  Patient presents to clinic today for Esophageal Motility Study with pH Impedance    Referring Provider: Dr. Dorita Souza  Does patient report taking a PPI (omeprazole, pantoprazole, rabeprazole, lansoprazole, esomeprazole, dexlansoprazole)? No  Does patient report taking a H2 blocker (ranitidine, or famotidine)? No  Does patient report taking opioids? No  Patient reported that last food and/or drink was last consumed 13 hours ago.  .    Patient Hx  Patient's history, medications and allergies were reviewed.   Patient has not undergone previous endoscopy.    Height: 5' 11\"   Weight: 153 lbs 14.4 oz    Patient Active Problem List    Diagnosis Date Noted     Abnormal CT of the chest 11/11/2020     Priority: Medium     Added automatically from request for surgery [id number]       " Coronary atherosclerosis of native coronary vessel 11/11/2020     Priority: Medium     Added automatically from request for surgery [id number]       Organ transplant candidate 11/11/2020     Priority: Medium     Added automatically from request for surgery [id number]       Lung disease, interstitial (H) 11/11/2020     Priority: Medium     Added automatically from request for surgery [id number]       Raynaud's disease without gangrene 02/08/2017     Priority: Medium     Mild dilation of ascending aorta (H) 11/21/2016     Priority: Medium     4/12/16 Echo  4.12 cm       ILD (interstitial lung disease) (H) 12/06/2012     Priority: Medium     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       History of atrial fibrillation 10/21/2009     Priority: Medium      Prior to Admission medications    Medication Sig Start Date End Date Taking? Authorizing Provider   calcium carbonate 600 mg-vitamin D 400 units (CALTRATE) 600-400 MG-UNIT per tablet Take 1 tablet by mouth daily   Yes Reported, Patient   cholecalciferol 25 MCG (1000 UT) TABS Take 5,000 Units by mouth daily   Yes Reported, Patient   nintedanib (OFEV) 100 MG capsule Take 100 mg by mouth daily   Yes Reported, Patient   predniSONE (DELTASONE) 5 MG tablet Take 1 tablet by mouth daily.   Yes Reported, Patient     No Known Allergies  Past Medical History:   Diagnosis Date     Coronary atherosclerosis of native coronary vessel 11/11/2020     History of blood transfusion 2009    [facility]     Uncomplicated asthma      Past Surgical History:   Procedure Laterality Date     BIOPSY  2018    colon polyp, [facility] SURGICAL PATHOLOGY  2009    [facility]     COLONOSCOPY  2018     ORTHOPEDIC SURGERY  1990    hand     Family History   Problem Relation Age of Onset     Lupus Mother      Alzheimer Disease Mother         passed away in her 80's      Raynaud syndrome Mother      Liver Cancer Father         passed away from liver cancer in his mid 80's     Bone Cancer Father       Prostate Cancer Father      Social History     Tobacco Use     Smoking status: Never Smoker     Smokeless tobacco: Never Used     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    Procedure Documentation: GI Esophageal Motility with 24 Hour Ph    Motility catheter was placed via right nare to 51 cm and normal saline swallows given per protocol. Motility catheter was removed at the end of test and the pH cathter was placed via right nare to 36 cm.      Diary and discharge instructions given to patient and patent instructed to return tomorrow for catheter removal.    Notification of pending test results sent to provider for interpretation. Please reference scanned document for final interpretation of results. Patient will follow up with referring provider for test results.    Radha Magaña RN on 12/8/2020 at 11:11 AM    Non-Invasive GI Nurse

## 2020-12-08 NOTE — LETTER
12/8/2020         RE: Eric Victor  [address]  [facility] [address]        Dear Colleague,    Thank you for referring your patient, Eric Victor, to the [facility]. Please see a copy of my visit note below.    Met with Archie and his wife Lor by video to discuss the coronary angiogram/right heart cath scheduled for tomorrow. Will check in at 10:30 am to [facility] for perfusion scan, to be followed by heart cath.  Reviewed rationale,  procedure and post care. Provided Coronary Angiogram booklet by email.  Instructed pt to take 325 mg ASA the night before and morning of procedure, per Heart Cath Lab protocol.  K+ 3.7 (low normal). Creatinine slightly elevated at 1.21, GFR 59.  Encouraged Archie to drink fluids today and following procedure tomorrow.  Instructed patient not to eat or drink after midnight, but could take meds in AM with sips clear liquids.  His wife will be available to drive and care for patient after procedure.    Pre-procedure heart cath orders have been completed.  Yes      Again, thank you for allowing me to participate in the care of your patient.        Sincerely,        Gloria Cloud RN

## 2020-12-08 NOTE — Clinical Note
The final procedure report is complete and has been sent to HIM to be scanned and attached to the procedure order. You can expect the report to be available for viewing from the Epic procedures tab within 2-3 business days.

## 2020-12-09 ENCOUNTER — HOSPITAL ENCOUNTER (OUTPATIENT)
Dept: NUCLEAR MEDICINE | Facility: CLINIC | Age: 70
Setting detail: NUCLEAR MEDICINE
Discharge: HOME | End: 2020-12-09
Attending: INTERNAL MEDICINE | Admitting: INTERNAL MEDICINE
Payer: MEDICARE

## 2020-12-09 ENCOUNTER — ALLIED HEALTH/NURSE VISIT (OUTPATIENT)
Dept: GASTROENTEROLOGY | Facility: CLINIC | Age: 70
End: 2020-12-09
Payer: MEDICARE

## 2020-12-09 ENCOUNTER — HOSPITAL ENCOUNTER (OUTPATIENT)
Facility: CLINIC | Age: 70
Discharge: HOME | End: 2020-12-09
Attending: INTERNAL MEDICINE | Admitting: INTERNAL MEDICINE
Payer: MEDICARE

## 2020-12-09 ENCOUNTER — APPOINTMENT (OUTPATIENT)
Dept: MEDSURG UNIT | Facility: CLINIC | Age: 70
End: 2020-12-09
Attending: INTERNAL MEDICINE
Payer: MEDICARE

## 2020-12-09 VITALS
SYSTOLIC BLOOD PRESSURE: 127 MMHG | WEIGHT: 153.9 LBS | HEART RATE: 62 BPM | RESPIRATION RATE: 20 BRPM | DIASTOLIC BLOOD PRESSURE: 89 MMHG | TEMPERATURE: 98 F | OXYGEN SATURATION: 99 % | BODY MASS INDEX: 21.55 KG/M2 | HEIGHT: 71 IN

## 2020-12-09 DIAGNOSIS — Z76.82 ORGAN TRANSPLANT CANDIDATE: ICD-10-CM

## 2020-12-09 DIAGNOSIS — I25.10 CORONARY ARTERY DISEASE INVOLVING NATIVE CORONARY ARTERY OF NATIVE HEART WITHOUT ANGINA PECTORIS: Primary | ICD-10-CM

## 2020-12-09 DIAGNOSIS — J84.9 LUNG DISEASE, INTERSTITIAL (H): ICD-10-CM

## 2020-12-09 DIAGNOSIS — R93.89 ABNORMAL CT OF THE CHEST: ICD-10-CM

## 2020-12-09 DIAGNOSIS — I25.10 CORONARY ATHEROSCLEROSIS OF NATIVE CORONARY VESSEL: ICD-10-CM

## 2020-12-09 DIAGNOSIS — Z76.82 ORGAN TRANSPLANT CANDIDATE: Primary | ICD-10-CM

## 2020-12-09 LAB
ANION GAP SERPL CALCULATED.3IONS-SCNC: 3 MMOL/L (ref 3–14)
BACTERIA SPEC CULT: NORMAL
BUN SERPL-MCNC: 22 MG/DL (ref 7–30)
CALCIUM SERPL-MCNC: 9 MG/DL (ref 8.5–10.1)
CHLORIDE SERPL-SCNC: 104 MMOL/L (ref 94–109)
CO2 SERPL-SCNC: 30 MMOL/L (ref 20–32)
CREAT SERPL-MCNC: 1 MG/DL (ref 0.66–1.25)
ERYTHROCYTE [DISTWIDTH] IN BLOOD BY AUTOMATED COUNT: 12.7 % (ref 10–15)
G6PD RBC-CCNC: 11.9 U/G HB (ref 9.9–16.6)
GFR SERPL CREATININE-BSD FRML MDRD: 75 ML/MIN/{1.73_M2}
GLUCOSE SERPL-MCNC: 96 MG/DL (ref 70–99)
HCT VFR BLD AUTO: 48.3 % (ref 40–53)
HGB BLD-MCNC: 15.5 G/DL (ref 13.3–17.7)
IGE SERPL-ACNC: 73 KIU/L (ref 0–114)
MCH RBC QN AUTO: 32.4 PG (ref 26.5–33)
MCHC RBC AUTO-ENTMCNC: 32.1 G/DL (ref 31.5–36.5)
MCV RBC AUTO: 101 FL (ref 78–100)
PLATELET # BLD AUTO: 212 10E9/L (ref 150–450)
POTASSIUM SERPL-SCNC: 4.1 MMOL/L (ref 3.4–5.3)
RBC # BLD AUTO: 4.79 10E12/L (ref 4.4–5.9)
SODIUM SERPL-SCNC: 138 MMOL/L (ref 133–144)
SPECIMEN SOURCE: NORMAL
WBC # BLD AUTO: 12.2 10E9/L (ref 4–11)

## 2020-12-09 PROCEDURE — 250N000013 HC RX MED GY IP 250 OP 250 PS 637: Mod: GY | Performed by: NURSE PRACTITIONER

## 2020-12-09 PROCEDURE — C1887 CATHETER, GUIDING: HCPCS | Performed by: INTERNAL MEDICINE

## 2020-12-09 PROCEDURE — 99207 PR NO CHARGE NURSE ONLY: CPT

## 2020-12-09 PROCEDURE — 93010 ELECTROCARDIOGRAM REPORT: CPT | Performed by: INTERNAL MEDICINE

## 2020-12-09 PROCEDURE — 99152 MOD SED SAME PHYS/QHP 5/>YRS: CPT | Mod: 59 | Performed by: INTERNAL MEDICINE

## 2020-12-09 PROCEDURE — 36415 COLL VENOUS BLD VENIPUNCTURE: CPT | Performed by: INTERNAL MEDICINE

## 2020-12-09 PROCEDURE — 250N000009 HC RX 250: Performed by: INTERNAL MEDICINE

## 2020-12-09 PROCEDURE — 272N000001 HC OR GENERAL SUPPLY STERILE: Performed by: INTERNAL MEDICINE

## 2020-12-09 PROCEDURE — 258N000003 HC RX IP 258 OP 636: Performed by: INTERNAL MEDICINE

## 2020-12-09 PROCEDURE — 99152 MOD SED SAME PHYS/QHP 5/>YRS: CPT | Performed by: INTERNAL MEDICINE

## 2020-12-09 PROCEDURE — 80048 BASIC METABOLIC PNL TOTAL CA: CPT | Performed by: INTERNAL MEDICINE

## 2020-12-09 PROCEDURE — 93456 R HRT CORONARY ARTERY ANGIO: CPT | Performed by: INTERNAL MEDICINE

## 2020-12-09 PROCEDURE — C1894 INTRO/SHEATH, NON-LASER: HCPCS | Performed by: INTERNAL MEDICINE

## 2020-12-09 PROCEDURE — 85027 COMPLETE CBC AUTOMATED: CPT | Performed by: INTERNAL MEDICINE

## 2020-12-09 PROCEDURE — 78580 LUNG PERFUSION IMAGING: CPT | Mod: 26 | Performed by: RADIOLOGY

## 2020-12-09 PROCEDURE — 343N000001 HC RX 343: Performed by: INTERNAL MEDICINE

## 2020-12-09 PROCEDURE — A9540 TC99M MAA: HCPCS | Performed by: INTERNAL MEDICINE

## 2020-12-09 PROCEDURE — 250N000011 HC RX IP 250 OP 636: Performed by: INTERNAL MEDICINE

## 2020-12-09 PROCEDURE — 78580 LUNG PERFUSION IMAGING: CPT

## 2020-12-09 PROCEDURE — 93456 R HRT CORONARY ARTERY ANGIO: CPT | Mod: 26 | Performed by: INTERNAL MEDICINE

## 2020-12-09 PROCEDURE — 99153 MOD SED SAME PHYS/QHP EA: CPT | Performed by: INTERNAL MEDICINE

## 2020-12-09 PROCEDURE — 999N000142 HC STATISTIC PROCEDURE PREP ONLY

## 2020-12-09 RX ORDER — FENTANYL CITRATE 50 UG/ML
INJECTION, SOLUTION INTRAMUSCULAR; INTRAVENOUS
Status: DISCONTINUED | OUTPATIENT
Start: 2020-12-09 | End: 2020-12-09 | Stop reason: HOSPADM

## 2020-12-09 RX ORDER — FLUMAZENIL 0.1 MG/ML
0.2 INJECTION, SOLUTION INTRAVENOUS
Status: DISCONTINUED | OUTPATIENT
Start: 2020-12-09 | End: 2020-12-09 | Stop reason: HOSPADM

## 2020-12-09 RX ORDER — NALOXONE HYDROCHLORIDE 0.4 MG/ML
0.2 INJECTION, SOLUTION INTRAMUSCULAR; INTRAVENOUS; SUBCUTANEOUS
Status: DISCONTINUED | OUTPATIENT
Start: 2020-12-09 | End: 2020-12-09 | Stop reason: HOSPADM

## 2020-12-09 RX ORDER — NALOXONE HYDROCHLORIDE 0.4 MG/ML
0.4 INJECTION, SOLUTION INTRAMUSCULAR; INTRAVENOUS; SUBCUTANEOUS
Status: DISCONTINUED | OUTPATIENT
Start: 2020-12-09 | End: 2020-12-09 | Stop reason: HOSPADM

## 2020-12-09 RX ORDER — FENTANYL CITRATE 50 UG/ML
25-50 INJECTION, SOLUTION INTRAMUSCULAR; INTRAVENOUS
Status: ACTIVE | OUTPATIENT
Start: 2020-12-09 | End: 2020-12-09

## 2020-12-09 RX ORDER — POTASSIUM CHLORIDE 750 MG/1
20 TABLET, EXTENDED RELEASE ORAL
Status: DISCONTINUED | OUTPATIENT
Start: 2020-12-09 | End: 2020-12-09 | Stop reason: HOSPADM

## 2020-12-09 RX ORDER — CLOPIDOGREL BISULFATE 75 MG/1
75 TABLET ORAL DAILY
Qty: 90 TABLET | Refills: 3 | Status: SHIPPED | OUTPATIENT
Start: 2020-12-10

## 2020-12-09 RX ORDER — CLOPIDOGREL BISULFATE 75 MG/1
75 TABLET ORAL DAILY
Qty: 90 TABLET | Refills: 3 | Status: SHIPPED | OUTPATIENT
Start: 2020-12-10 | End: 2020-12-09

## 2020-12-09 RX ORDER — CLOPIDOGREL BISULFATE 75 MG/1
600 TABLET ORAL ONCE
Status: COMPLETED | OUTPATIENT
Start: 2020-12-09 | End: 2020-12-09

## 2020-12-09 RX ORDER — SODIUM CHLORIDE 9 MG/ML
INJECTION, SOLUTION INTRAVENOUS CONTINUOUS
Status: DISCONTINUED | OUTPATIENT
Start: 2020-12-09 | End: 2020-12-09 | Stop reason: HOSPADM

## 2020-12-09 RX ORDER — HEPARIN SODIUM 1000 [USP'U]/ML
INJECTION, SOLUTION INTRAVENOUS; SUBCUTANEOUS
Status: DISCONTINUED | OUTPATIENT
Start: 2020-12-09 | End: 2020-12-09 | Stop reason: HOSPADM

## 2020-12-09 RX ORDER — ARGATROBAN 1 MG/ML
150 INJECTION, SOLUTION INTRAVENOUS
Status: DISCONTINUED | OUTPATIENT
Start: 2020-12-09 | End: 2020-12-09 | Stop reason: HOSPADM

## 2020-12-09 RX ORDER — EPTIFIBATIDE 2 MG/ML
180 INJECTION, SOLUTION INTRAVENOUS EVERY 10 MIN PRN
Status: DISCONTINUED | OUTPATIENT
Start: 2020-12-09 | End: 2020-12-09 | Stop reason: HOSPADM

## 2020-12-09 RX ORDER — ARGATROBAN 1 MG/ML
350 INJECTION, SOLUTION INTRAVENOUS
Status: DISCONTINUED | OUTPATIENT
Start: 2020-12-09 | End: 2020-12-09 | Stop reason: HOSPADM

## 2020-12-09 RX ORDER — ATROPINE SULFATE 0.1 MG/ML
0.5 INJECTION INTRAVENOUS
Status: DISCONTINUED | OUTPATIENT
Start: 2020-12-09 | End: 2020-12-09 | Stop reason: HOSPADM

## 2020-12-09 RX ORDER — EPTIFIBATIDE 2 MG/ML
2 INJECTION, SOLUTION INTRAVENOUS CONTINUOUS PRN
Status: DISCONTINUED | OUTPATIENT
Start: 2020-12-09 | End: 2020-12-09 | Stop reason: HOSPADM

## 2020-12-09 RX ORDER — LIDOCAINE 40 MG/G
CREAM TOPICAL
Status: DISCONTINUED | OUTPATIENT
Start: 2020-12-09 | End: 2020-12-09 | Stop reason: HOSPADM

## 2020-12-09 RX ORDER — NITROGLYCERIN 5 MG/ML
VIAL (ML) INTRAVENOUS
Status: DISCONTINUED | OUTPATIENT
Start: 2020-12-09 | End: 2020-12-09 | Stop reason: HOSPADM

## 2020-12-09 RX ORDER — IOPAMIDOL 755 MG/ML
INJECTION, SOLUTION INTRAVASCULAR
Status: DISCONTINUED | OUTPATIENT
Start: 2020-12-09 | End: 2020-12-09 | Stop reason: HOSPADM

## 2020-12-09 RX ORDER — HEPARIN SODIUM 10000 [USP'U]/100ML
100-1000 INJECTION, SOLUTION INTRAVENOUS CONTINUOUS PRN
Status: DISCONTINUED | OUTPATIENT
Start: 2020-12-09 | End: 2020-12-09 | Stop reason: HOSPADM

## 2020-12-09 RX ORDER — VERAPAMIL HYDROCHLORIDE 2.5 MG/ML
INJECTION, SOLUTION INTRAVENOUS
Status: DISCONTINUED | OUTPATIENT
Start: 2020-12-09 | End: 2020-12-09 | Stop reason: HOSPADM

## 2020-12-09 RX ORDER — DOBUTAMINE HYDROCHLORIDE 200 MG/100ML
2-20 INJECTION INTRAVENOUS CONTINUOUS PRN
Status: DISCONTINUED | OUTPATIENT
Start: 2020-12-09 | End: 2020-12-09 | Stop reason: HOSPADM

## 2020-12-09 RX ORDER — POTASSIUM CHLORIDE 750 MG/1
40 TABLET, EXTENDED RELEASE ORAL
Status: DISCONTINUED | OUTPATIENT
Start: 2020-12-09 | End: 2020-12-09 | Stop reason: HOSPADM

## 2020-12-09 RX ORDER — DOPAMINE HYDROCHLORIDE 160 MG/100ML
2-20 INJECTION, SOLUTION INTRAVENOUS CONTINUOUS PRN
Status: DISCONTINUED | OUTPATIENT
Start: 2020-12-09 | End: 2020-12-09 | Stop reason: HOSPADM

## 2020-12-09 RX ADMIN — KIT FOR THE PREPARATION OF TECHNETIUM TC 99M ALBUMIN AGGREGATED 6.5 MILLICURIE: 2.5 INJECTION, POWDER, FOR SOLUTION INTRAVENOUS at 10:00

## 2020-12-09 RX ADMIN — SODIUM CHLORIDE: 9 INJECTION, SOLUTION INTRAVENOUS at 11:31

## 2020-12-09 RX ADMIN — CLOPIDOGREL BISULFATE 600 MG: 75 TABLET, FILM COATED ORAL at 17:24

## 2020-12-09 ASSESSMENT — MIFFLIN-ST. JEOR: SCORE: 1469.96

## 2020-12-09 NOTE — PLAN OF CARE
D/I/A: Pt roomed on 3C in [place] 25.  Arrived via litter and accompanied by CL RN off monitor.  VSSA.  Rhythm upon arrival Sinus bradycardia on monitor.  Denies pain or sob.  Reviewed activity restrictions and when to notify RN, ie-changes to breathing or increased chest pressure or chest pain.  CCL access:  Right PIV. Right TR Band in place. Right internal jugular site, dressing CDI.   P: Continue to monitor status.  Discharge to home once meeting criteria.

## 2020-12-09 NOTE — PROGRESS NOTES
"Eric Victor is a 70 year old male who is being evaluated via a billable video visit.      The patient has been notified of following:     \"This video visit will be conducted via a call between you and your physician/provider. We have found that certain health care needs can be provided without the need for an in-person physical exam.  This service lets us provide the care you need with a video conversation.  If a prescription is necessary we can send it directly to your pharmacy.  If lab work is needed we can place an order for that and you can then stop by our lab to have the test done at a later time.    Video visits are billed at different rates depending on your insurance coverage.  Please reach out to your insurance provider with any questions.    If during the course of the call the physician/provider feels a video visit is not appropriate, you will not be charged for this service.\"    Patient has given verbal consent for Video visit? Yes  How would you like to obtain your AVS? MyChart  If you are dropped from the video visit, the video invite should be resent to: Text to cell phone: [phone]  Will anyone else be joining your video visit? No      Vitals - Patient Reported  Weight (Patient Reported): 69.4 kg (153 lb)  Pain Score: No Pain (0)      I have reviewed and updated patient's allergy and medication list.    Concerns: NONE  Refills: NONE      Suki Suárez CMA  Video-Visit Details    Type of service:  Video Visit    Video Start Time: 10:39 AM  Video End Time: 11:14 AM    Originating Location (pt. Location): Home    Distant Location (provider location):  [facility]     Platform used for Video Visit: [facility]    Palliative Care Outpatient Clinic Consultation Note    Patient:  Eric Victor    Chief Complaint:   Eric Vicotr 70 year old male who is presenting to the palliative medicine clinic today at the request of Dr. Souza for a palliative care " "consultation secondary to ILD.   The patient's primary care provider is:  Michelle Kent     History of Present Illness:  Eric Victor is a 70 year old with ILD being evaluated for lung transplant, which he found out today is not a candidate due to coronary artery disease, seen to establish palliative care.  He is joined by his wife Lor    He was diagnosed with IPF in 2009, and at the time was very active and had to stop running marathons at age 60.  He had a severe pneumonia in 2012 requiring 2 weeks of mechanical ventilation, but was able to successfully rehab to an active lifestyle again.  He had had a decline in 2019 and on in February 2020 was started on Ofev, and had about 3 or 4 prednisone bursts for worsening respiratory function.  Up until the spring he was able to mow his lawn with a push mower, walk at a brisk pace, and do light snow shoveling.  Since the end of the summer he is mostly been shuffling when he walks, but has maintained walking 30 to 40 minutes a day slowly.  He wears 2 L of oxygen at night.  He does not need any assistance with walking.  He notes feeling a little more short of breath with bathing and dressing, but does not have to rest.  He is able to vacuum and do light housework.    Mild nausea with the Ofev, good appetite.  Diarrhea is manageable on his current dose.  Sleeps pretty well.  Stays upbeat, no depressed mood or anxiety    Patient's Disease Understanding: Describes that his expectation is he would continue to slow down until he was mostly in the chair, and likely would need to wear oxygen all the time then, \" and then the morphine.\"    Coping: Says has good support from his family and very close friends.  Tries to keep a positive attitude.  He wants to keep living as long as he has a good quality of life, which he defines as being able to go to the cabin every week and play with his grandkids.    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   Substance Use Topics     Alcohol use: Yes     Frequency: 2-4 times a month     Drinks per session: 1 or 2     Binge frequency: Never     Drug use: Never       Family History  Family History   Problem Relation Age of Onset     Lupus Mother      Alzheimer Disease Mother         passed away in her 80's      Raynaud syndrome Mother      Liver Cancer Father         passed away from liver cancer in his mid 80's     Bone Cancer Father      Prostate Cancer Father      Patient's Involvement with Prior History of Serious Illness in Family: Has not had anybody in his family with lung or heart disease    Advance Care Planning:  Advance Directive:    None on file.  Says has a living will, wife and middle son would be who he trusts to make medical decisions    No Known Allergies     No current outpatient medications on file.     Past Medical History:   Diagnosis Date     Coronary atherosclerosis of native coronary vessel 11/11/2020     History of blood transfusion 2009    [facility]     Uncomplicated asthma      Past Surgical History:   Procedure Laterality Date     BIOPSY  2018    colon polyp, [place]      AFF SURGICAL PATHOLOGY  2009    [facility]     COLONOSCOPY  2018     ORTHOPEDIC SURGERY  1990    hand       REVIEW OF SYSTEMS:   ROS: 10 point ROS neg other than the symptoms noted above in the HPI and here:  Palliative Symptom Review (0=no symptom/no concern, 1=mild, 2=moderate,  3=severe):      Pain: 0      Fatigue: 1-2      Nausea: 1      Constipation: 0      Diarrhea: 10      Depressive Symptoms: 0      Anxiety: 0      Drowsiness: 0      Poor Appetite: 0      Shortness of Breath: 1-2      Insomnia: 0      Other: 0      Overall (0 good/no concerns, 3 very poor): 0    There were no vitals taken for this visit.    GENERAL: Comfortable-appearing, alert and no distress  EYES: Eyes grossly normal to inspection, conjunctivae and sclerae normal  Hearing intact.   RESP: no audible wheeze, cough, or visible cyanosis.  No increased work of breathing on RA.  Able to speak easily in complete sentences.  SKIN: no suspicious lesions or rashes on exposed skin  NEURO: Cranial nerves grossly intact, mentation intact and speech normal.  No tremor.   PSYCH: mentation appears normal, affect normal/bright, judgement and insight intact, normal speech and appearance   The rest of a comprehensive physical examination is deferred due to PHE (public health emergency) video visit restrictions  Wt Readings from Last 10 Encounters:   12/09/20 69.8 kg (153 lb 14.4 oz)   12/08/20 69.8 kg (153 lb 14.4 oz)   10/02/20 69.9 kg (154 lb)   06/05/13 70.3 kg (155 lb)   02/27/13 74.8 kg (165 lb)   01/02/13 74.1 kg (163 lb 4.8 oz)       Data Reviewed:  LABS:   Recent Labs   Lab Test 12/09/20  1028 12/07/20  1016    140   POTASSIUM 4.1 3.7   CHLORIDE 104 103   CO2 30 32   ANIONGAP 3 5   GLC 96 91   BUN 22 23   CR 1.00 1.21   ADAL 9.0 9.0     GFR 75  WBC 12.2, Hb 15.5    IMAGING:   CT chest 12/7/2020-extensive fibrosis with honeycombing, consistent with IPF and UIP pattern.  Masslike consolidation in the left upper lobe 4.8 x 2.5 cm, numerous areas of nodular consolidation bilaterally,  mediastinal and hilar adenopathy present.    Echo 12/7/2020-LV function normal with EF 55 to 60%.  Mild RV dilation, normal RV function.  Mildly positive bubble study suggestive of very small PFO.    Impressions, Recommendations &  Counseling:  Palliative Performance Score: 80  Decision Making Capacity: Intact    Eric Victor is a 70 year old with IPF seen for early palliative care.  I introduced the palliative care team and our role in maximizing quality of life, including symptom management, psychosocial and spiritual support, and help making medical decisions and planning for the future.  His primary symptom is shortness of breath with ambulation, and though he has noticed a decrease in his speed of walking and endurance, he is able to walk 30 to 40 minutes at a slow pace.    Shortness of breath-he has never had pulmonary rehab.  Discussed this would be recommended at some point in the future to help improve his endurance and can help shortness of breath  -I addressed the issue of morphine, and that it can be used at low doses when shortness of breath is becoming limiting to do ADLs.  I do not believe this is needed yet    Advance care planning-he has a good understanding of his current disease and feels he gets good information from his pulmonologist.  He is completed a living will.  Recommended reviewing periodically to make sure he still agrees with what he has written.   - He says his primary goal is to keep living, not just be alive.  A good quality of life to him is being able to go to his cabin and play with his grandchildren.    - He has several questions about the ability to get mobile oxygen to allow him to go to his cabin, which I was not able to answer.  When that time comes, I said talking to his oxygen supplier, insurance, and pulmonologist to help work out what would be the best solution to meet his goals.    - He would be amenable to treating reversible illnesses, and may get a stent for this coronary artery disease.    - He says ultimately were time short he would prefer his care to be at home.  I introduced the idea of a POLST to make his wishes known about emergency end-of-life care in the future, and this could be  completed with his primary care or pulmonologist    I gave him our contact information, and will follow up on an as-needed basis if further symptom management or care planning needs arise.    Margaret Travis MD  Palliative Medicine  Pager [phone]     (This note was transcribed using voice recognition software. While I review and edit the transcription, I may miss errors, and the software sometimes does unexpected capitalizations and formatting that I miss. Please let me know of any serious mistranscriptions and I will addend this note.)

## 2020-12-09 NOTE — PROGRESS NOTES
Pt prepped for right heart cath and CORS.Groins shaved and pulses marked and charted.IV contrast discharge instructions reviewed and signed and copy given to pt.Consent signed and questions answered.Pt's wife will take care of pt after procedure.Pt already had a PIV from previous scan.

## 2020-12-09 NOTE — H&P
[facility]      History and Physicial  Eric Victor MRN: [id number]  1950  Date of Admission:12/9/2020  Primary care provider: Michelle Kent         Chief Complaint:   Lung transplant evaluation          History of Present Illness:   Eric Victor is a 70 year old male with a past medical history of ILD who presents for right heart catheterization and coronary angiogram as part of his lung transplant evaluation. Patient reports baseline shortness of breath. He is on 2 liters supplemental oxygen with activity and at night. He denies any additional cardiac symptoms.          Review of Systems:    10 point review of systems negative except for stated above in HPI.          Past Medical History:   Medical History reviewed.   Past Medical History:   Diagnosis Date     Coronary atherosclerosis of native coronary vessel 11/11/2020     History of blood transfusion 2009    [facility]     Uncomplicated asthma              Past Surgical History:   Surgical History reviewed.   Past Surgical History:   Procedure Laterality Date     BIOPSY  2018    colon polyp, [place]     CL AFF SURGICAL PATHOLOGY  2009    [facility]     COLONOSCOPY  2018     ORTHOPEDIC SURGERY  1990    hand             Social History:   Social History reviewed.  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   Family History   Problem Relation Age of Onset     Lupus Mother      Alzheimer Disease Mother         passed away in her 80's      Raynaud syndrome Mother      Liver Cancer Father         passed away from liver cancer in his mid 80's     Bone Cancer Father      Prostate Cancer Father   "            Allergies:   No Known Allergies          Medications:   Medications Reviewed.   Current Facility-Administered Medications   Medication     aspirin (ASA) EC tablet 325 mg     lidocaine (LMX4) cream     lidocaine 1 % 0.1-1 mL     Patient is NOT allergic to contrast dye OR was given pre-procedure oral steroids for treatment     potassium chloride ER (KLOR-CON M) CR tablet 20 mEq     potassium chloride ER (KLOR-CON M) CR tablet 40 mEq     [START ON 12/10/2020] predniSONE (DELTASONE) tablet 30 mg     sodium chloride (PF) 0.9% PF flush 3 mL     sodium chloride (PF) 0.9% PF flush 3 mL     sodium chloride 0.9% infusion           Physical Exam:   Vitals were reviewed.  Blood pressure (!) 135/91, pulse 74, temperature 97.9  F (36.6  C), temperature source Oral, height 1.803 m (5' 10.98\"), weight 69.8 kg (153 lb 14.4 oz), SpO2 100 %.  General: AAOx3, NAD  Skin: Not jaundiced, no rash, no ecchymoses  HEENT: MMM, PERRLA, EOM intact  CV: RRR, normal S1S2, no murmur, clicks, rubs  Resp: Clear to auscultation bilaterally, no wheezes, rhonchi  Abd: Soft, non-tender, BS+, no masses appreciated  Extremities: warm and well perfused, palpable pulses, no edema  Neuro: No lateralizing symptoms or focal neurologic deficits        Labs:   Routine Labs:  No results found for: TROPI, TROPONIN, TROPR, TROPN  CMP  Recent Labs   Lab 12/09/20  1028 12/07/20  1016    140   POTASSIUM 4.1 3.7   CHLORIDE 104 103   CO2 30 32   ANIONGAP 3 5   GLC 96 91   BUN 22 23   CR 1.00 1.21   GFRESTIMATED 75 59*   GFRESTBLACK 87 69   ADAL 9.0 9.0   MAG  --  2.3   PHOS  --  3.0   PROTTOTAL  --  7.4   ALBUMIN  --  3.8   BILITOTAL  --  0.8   ALKPHOS  --  60   AST  --  14   ALT  --  19     CBC  Recent Labs   Lab 12/09/20  1119 12/07/20  1016   WBC 12.2* 11.1*   RBC 4.79 5.17   HGB 15.5 16.8   HCT 48.3 52.4   * 101*   MCH 32.4 32.5   MCHC 32.1 32.1   RDW 12.7 12.7    236     INR  Recent Labs   Lab 12/07/20  1016   INR 0.98           " Diagnostics:      ECHO 12/2020:     Interpretation Summary  Global and regional left ventricular function is normal with an EF of 55-60%.  Mild right ventricular dilation is present.  Global right ventricular function is normal.  Mildly positive bubble study suggestive of a very small PFO.  Right ventricular systolic pressure is 39mmHg above the right atrial pressure.  Ascending aorta 4.3 cm.  Sinuses of Valsalva 4.5 cm.  IVC diameter <2.1 cm collapsing >50% with sniff suggests a normal RA pressure  of 3 mmHg.  No pericardial effusion is present.  Mild aortic root dilation and moderate dilation of ascending aorta when  infexed to BSA of 1.9m2.    ECG 12/2020:  Sinus rhythm HR 71, no ischemia or infarct    Assessment and Plan:     Eric Victor is a 70 year old male with a past medical history of ILD who presents for right heart catheterization and coronary angiogram as part of his lung transplant evaluation. Patient reports baseline shortness of breath. He is on 2 liters supplemental oxygen with activity and at night. He denies any additional cardiac symptoms. We discussed risks and benefits of the procedure and patient wishes to proceed.     JESSA Helms, NP-C  [facility] Cardiology Team

## 2020-12-09 NOTE — NURSING NOTE
"24hr pH catheter pulled without issue.  Monitor and diary retrieved.  Pt stated instructions said to not take aspirin, but he did and logged it in his diary due to heart procedure early today.   Pt also expressed some concern about getting data needed due to confusion about \"a light on the monitor\".  Lazara Conley, EMT    "

## 2020-12-09 NOTE — DISCHARGE INSTRUCTIONS
"1. You have blockage in your heart arteries that will likely require intervention with stenting or open heart surgery.   2. Please start Aspirin 81 mg daily and Plavix 75 mg daily for treatment of heart disease.   3. Your lung transplant team is planning to discuss your case tomorrow and will contact you regarding next steps.   4. Follow-up with Cardiology JACK in 2 weeks for further discussion regarding ongoing management of coronary artery disease.   5. Please return to the ED if you develop any chest pain and/or bleeding at your access site.     Questions and [phone].   First press #1 for the University and then press #3 for Medical Questions to reach the Cardiology triage nurse.     On Call Cardiologist for after hours or on weekends: [phone], press option #4 and ask to speak to the on-call Cardiologist  Discharge Instructions for Cardiac Catheterization   Cardiac catheterization is an invasive procedure to evaluate for certain heart problems involving the hearts chambers, valves, and blood vessels.  A thin, flexible tube (catheter) is put in a blood vessel in your groin or arm. Once the catheter is advanced into the heart measurements can be taken to assess blood flow, pressure, and oxygen. The healthcare provider can inject contrast fluid into your blood, which then flows to your heart. X-rays pictures can then be taken of your heart.  Often \"coronary angiography\" is performed as part of a cardiac catheterization which looks for blocked areas in the arteries that send blood to the heart. If a significant blockage is found your doctor may attempt to open up the artery which often involves placing a stent. Your provider will review the results of your procedure with you. Be sure to ask any questions you have before you leave. This sheet will help you take care of yourself at home.  Home care    Don't drive or make any important decisions for at least 24 hours after getting any type of " sedation or anesthesia.     Arrange to have a responsible adult drive you home after your procedure.    Only do light and easy activities for the next  2 to 3 days. Ask for help with chores and errands while you recover. Have someone drive you to your appointments.    Don't lift anything heavy until your healthcare team tells you when it's safe to lift again.    Ask your healthcare team when you can expect to return to work. Unless your job involves lifting, you may be able to return to your normal activities within a couple of days.    Take your medicines as directed. Don't skip doses.    Drink  6 to 8 glasses of water a day. This is to help flush the contrast dye out of your body. Call your healthcare team if your urine has any change in color.    Take your temperature each day for 7 days. If you feel cold and clammy or start sweating, take your temperature right away and call your healthcare team.    Check your incisions every day for signs of infection. These include redness, swelling, and drainage. It's normal to have a small bruise or bump where the catheter was inserted. A bruise that's getting larger is not normal and should be reported to your healthcare team. If you see blood forming in the incision, call your healthcare team. Go to the emergency department if you have uncontrolled bleeding from the artery site. This is especially true if you take medicines that make it hard for your blood to clot. Examples are aspirin, clopidogrel, and warfarin.    Eat a healthy diet. Make sure it's low in fat, salt, and cholesterol. Ask your healthcare team for diet information.    Stop smoking. Enroll in a stop-smoking program or ask your healthcare team for help. Stop-smoking programs can be life saving.    Exercise as your healthcare team tells you to. Your healthcare team may recommend you start a cardiac rehabilitation program. Cardiac rehab is an exercise program in which trained healthcare staff watch your  progress and stress on your heart while you exercise. Ask your team how to enroll.    Don't swim or take baths until your healthcare team says it s OK. You can shower the day after the procedure. Keep the site clean and dry. This keeps the incision from getting wet and infected until the skin and artery can heal.    Be sure to follow all after-care instructions.     Follow-up care    Make a follow-up appointment as advised by our staff. It's common to have a follow-up appointment 2 to 4 weeks after an angioplasty or coronary stent procedure.    Make a yearly appointment, too. This is to make sure you are still doing well and not having any new symptoms.    Don't wait for a follow-up appointment if your medicines aren't working or you are having heart-related symptoms.    When to seek medical care  Call your healthcare provider right away if you have any of the following:    Chest pain    Constant or increasing pain or numbness in your leg    Fever of 100.4  F ( 38.0 C) or higher, or as directed by your healthcare provider    Symptoms of infection. These include redness, swelling, drainage, or warmth at the incision site.    Shortness of breath    A leg that feels cold or appears blue    Bleeding, bruising, or a lot of swelling where the catheter was inserted    Blood in your urine    Black or tarry stools    Any unusual bleeding  [employer] last reviewed this educational content on 10/1/2019    1682-3252 [employer]. [address] [place] [address]. All rights reserved. This information is not intended as a substitute for professional medical care. Always follow your healthcare professional's instructions.        Going Home after an Angioplasty or Stent Placement (Cardiac)  ______________________________________________      After you go home:    Have an adult stay with you for 24 hours.    Drink plenty of fluids.    You may eat your normal diet, unless your doctor tells you otherwise.    For  24 hours:    Relax and take it easy.    Do NOT smoke.    Do NOT make any important or legal decisions.    Do NOT drive or operate machines at home or at work.    Do NOT drink alcohol.    Remove the Band-Aid after 24 hours. If there is minor oozing, apply another Band-aid and remove it after 12 hours.    For 2 days, do NOT have sex or do any heavy exercise.    Do NOT take a bath, or use a hot tub or pool for at least 3 days. You may shower.      Care of wrist or arm site  It is normal to have soreness at the puncture site and mild tingling in your hand for up to 3 days.    For 2 days, do not use your hand or arm to support your weight (such as rising from a chair) or bend your wrist (such as lifting a garage door).    For 2 days, do not lift more than 5 pounds or exercise your arm (tennis, golf or bowling).    If you start bleeding from the site in your arm:    Sit down and press firmly on the site with your fingers for 10 minutes. Call your doctor as soon as you can.    If the bleeding stops, sit still and keep your wrist straight for 2 hours.    Medicines    If you have started taking Plavix or Effient, do not stop taking it until you talk to your heart doctor (cardiologist).    If you are on metformin (Glucophage), do not restart it until you have blood tests (within 2 to 3 days after discharge). When your doctor tells you it is safe, you may restart the metformin.    If you have stopped any other medicines, check with your nurse or provider about when to restart them.    Call 911 right away if you have bleeding that is heavy or does not stop.    Call your doctor if:    You have a large or growing hard lump around the site.    The site is red, swollen, hot or tender.    Blood or fluid is draining from the site.    You have chills or a fever greater than 101 F (38 C).    Your leg or arm feels numb or cool.    You have hives, a rash or unusual itching.      [facility]:  [phone] (7 days a week)

## 2020-12-10 ENCOUNTER — COMMITTEE REVIEW (OUTPATIENT)
Dept: TRANSPLANT | Facility: CLINIC | Age: 70
End: 2020-12-10

## 2020-12-10 ENCOUNTER — VIRTUAL VISIT (OUTPATIENT)
Dept: PALLIATIVE CARE | Facility: CLINIC | Age: 70
End: 2020-12-10
Attending: INTERNAL MEDICINE
Payer: MEDICARE

## 2020-12-10 ENCOUNTER — HOSPITAL ENCOUNTER (OUTPATIENT)
Facility: CLINIC | Age: 70
Discharge: HOME | End: 2020-12-10
Attending: INTERNAL MEDICINE | Admitting: INTERNAL MEDICINE
Payer: MEDICARE

## 2020-12-10 ENCOUNTER — TELEPHONE (OUTPATIENT)
Dept: TRANSPLANT | Facility: CLINIC | Age: 70
End: 2020-12-10

## 2020-12-10 VITALS
RESPIRATION RATE: 16 BRPM | TEMPERATURE: 98.2 F | DIASTOLIC BLOOD PRESSURE: 96 MMHG | OXYGEN SATURATION: 98 % | HEIGHT: 71 IN | HEART RATE: 68 BPM | BODY MASS INDEX: 21.14 KG/M2 | WEIGHT: 151 LBS | SYSTOLIC BLOOD PRESSURE: 146 MMHG

## 2020-12-10 DIAGNOSIS — I25.10 CORONARY ARTERY DISEASE INVOLVING NATIVE CORONARY ARTERY OF NATIVE HEART WITHOUT ANGINA PECTORIS: Primary | ICD-10-CM

## 2020-12-10 DIAGNOSIS — Z98.890 STATUS POST CORONARY ANGIOGRAM: Primary | ICD-10-CM

## 2020-12-10 DIAGNOSIS — Z71.89 COUNSELING REGARDING ADVANCED CARE PLANNING AND GOALS OF CARE: Primary | ICD-10-CM

## 2020-12-10 DIAGNOSIS — I25.10 CORONARY ARTERY DISEASE INVOLVING NATIVE CORONARY ARTERY OF NATIVE HEART WITHOUT ANGINA PECTORIS: ICD-10-CM

## 2020-12-10 DIAGNOSIS — I25.110 CORONARY ARTERY DISEASE INVOLVING NATIVE HEART WITH UNSTABLE ANGINA PECTORIS, UNSPECIFIED VESSEL OR LESION TYPE (H): Primary | ICD-10-CM

## 2020-12-10 DIAGNOSIS — R06.09 DYSPNEA ON EXERTION: ICD-10-CM

## 2020-12-10 DIAGNOSIS — J84.9 LUNG DISEASE, INTERSTITIAL (H): ICD-10-CM

## 2020-12-10 LAB
ACID FAST STN SPEC QL: NORMAL
KCT BLD-ACNC: 258 SEC (ref 75–150)
KCT BLD-ACNC: 290 SEC (ref 75–150)
KCT BLD-ACNC: 351 SEC (ref 75–150)
PROTOCOL CUTOFF: NORMAL
SA1 CELL: NORMAL
SA1 COMMENTS: NORMAL
SA1 HI RISK ABY: NORMAL
SA1 MOD RISK ABY: NORMAL
SA1 TEST METHOD: NORMAL
SA2 CELL: NORMAL
SA2 COMMENTS: NORMAL
SA2 HI RISK ABY UA: NORMAL
SA2 MOD RISK ABY: NORMAL
SA2 TEST METHOD: NORMAL
SPECIMEN SOURCE: NORMAL
TPMT BLD-CCNC: 25.5 U/ML (ref 24–44)
UNACCEPTABLE ANTIGEN: NORMAL
UNOS CPRA: 0

## 2020-12-10 PROCEDURE — 99152 MOD SED SAME PHYS/QHP 5/>YRS: CPT | Mod: 59 | Performed by: INTERNAL MEDICINE

## 2020-12-10 PROCEDURE — C1874 STENT, COATED/COV W/DEL SYS: HCPCS | Performed by: INTERNAL MEDICINE

## 2020-12-10 PROCEDURE — 250N000009 HC RX 250: Performed by: INTERNAL MEDICINE

## 2020-12-10 PROCEDURE — 85347 COAGULATION TIME ACTIVATED: CPT

## 2020-12-10 PROCEDURE — 999N001193 HC VIDEO/TELEPHONE VISIT; NO CHARGE

## 2020-12-10 PROCEDURE — C1760 CLOSURE DEV, VASC: HCPCS | Performed by: INTERNAL MEDICINE

## 2020-12-10 PROCEDURE — C1769 GUIDE WIRE: HCPCS | Performed by: INTERNAL MEDICINE

## 2020-12-10 PROCEDURE — 92928 PRQ TCAT PLMT NTRAC ST 1 LES: CPT | Mod: RC | Performed by: INTERNAL MEDICINE

## 2020-12-10 PROCEDURE — 92921 HC PRQ TRLUML CORONARY ANGIOPLASTY ADDL BRANCH: CPT | Mod: RC

## 2020-12-10 PROCEDURE — C1894 INTRO/SHEATH, NON-LASER: HCPCS | Performed by: INTERNAL MEDICINE

## 2020-12-10 PROCEDURE — 99152 MOD SED SAME PHYS/QHP 5/>YRS: CPT | Performed by: INTERNAL MEDICINE

## 2020-12-10 PROCEDURE — C1753 CATH, INTRAVAS ULTRASOUND: HCPCS | Performed by: INTERNAL MEDICINE

## 2020-12-10 PROCEDURE — 272N000001 HC OR GENERAL SUPPLY STERILE: Performed by: INTERNAL MEDICINE

## 2020-12-10 PROCEDURE — 250N000013 HC RX MED GY IP 250 OP 250 PS 637: Performed by: NURSE PRACTITIONER

## 2020-12-10 PROCEDURE — C1887 CATHETER, GUIDING: HCPCS | Performed by: INTERNAL MEDICINE

## 2020-12-10 PROCEDURE — 99153 MOD SED SAME PHYS/QHP EA: CPT | Performed by: INTERNAL MEDICINE

## 2020-12-10 PROCEDURE — 92921 PR PRQ TRLUML CORONARY ANGIOPLASTY ADDL BRANCH: CPT | Mod: RC | Performed by: INTERNAL MEDICINE

## 2020-12-10 PROCEDURE — 93005 ELECTROCARDIOGRAM TRACING: CPT

## 2020-12-10 PROCEDURE — C9600 PERC DRUG-EL COR STENT SING: HCPCS | Mod: RC | Performed by: INTERNAL MEDICINE

## 2020-12-10 PROCEDURE — C1725 CATH, TRANSLUMIN NON-LASER: HCPCS | Performed by: INTERNAL MEDICINE

## 2020-12-10 PROCEDURE — 99203 OFFICE O/P NEW LOW 30 MIN: CPT | Mod: 95 | Performed by: INTERNAL MEDICINE

## 2020-12-10 PROCEDURE — 999N000054 HC STATISTIC EKG NON-CHARGEABLE

## 2020-12-10 PROCEDURE — 258N000003 HC RX IP 258 OP 636: Performed by: NURSE PRACTITIONER

## 2020-12-10 PROCEDURE — 250N000011 HC RX IP 250 OP 636: Performed by: INTERNAL MEDICINE

## 2020-12-10 PROCEDURE — 93010 ELECTROCARDIOGRAM REPORT: CPT | Mod: 76 | Performed by: INTERNAL MEDICINE

## 2020-12-10 PROCEDURE — 92978 ENDOLUMINL IVUS OCT C 1ST: CPT | Performed by: INTERNAL MEDICINE

## 2020-12-10 DEVICE — STENT SYNERGY DRUG ELUTING 4.00X12MM  H7493926012400: Type: IMPLANTABLE DEVICE | Status: FUNCTIONAL

## 2020-12-10 DEVICE — STENT SYNERGY DRUG ELUTING 4.00X20MM  H7493926020400: Type: IMPLANTABLE DEVICE | Status: FUNCTIONAL

## 2020-12-10 DEVICE — STENT SYNERGY DRUG ELUTING 4.00X16MM  H7493926016400: Type: IMPLANTABLE DEVICE | Status: FUNCTIONAL

## 2020-12-10 RX ORDER — POTASSIUM CHLORIDE 1500 MG/1
40 TABLET, EXTENDED RELEASE ORAL
Status: CANCELLED | OUTPATIENT
Start: 2020-12-10

## 2020-12-10 RX ORDER — ONDANSETRON 2 MG/ML
4 INJECTION INTRAMUSCULAR; INTRAVENOUS EVERY 6 HOURS PRN
Status: DISCONTINUED | OUTPATIENT
Start: 2020-12-10 | End: 2020-12-10 | Stop reason: HOSPADM

## 2020-12-10 RX ORDER — NALOXONE HYDROCHLORIDE 0.4 MG/ML
0.4 INJECTION, SOLUTION INTRAMUSCULAR; INTRAVENOUS; SUBCUTANEOUS
Status: DISCONTINUED | OUTPATIENT
Start: 2020-12-10 | End: 2020-12-10 | Stop reason: HOSPADM

## 2020-12-10 RX ORDER — SODIUM CHLORIDE 9 MG/ML
INJECTION, SOLUTION INTRAVENOUS CONTINUOUS
Status: DISCONTINUED | OUTPATIENT
Start: 2020-12-10 | End: 2020-12-10 | Stop reason: HOSPADM

## 2020-12-10 RX ORDER — POTASSIUM CHLORIDE 750 MG/1
20 TABLET, EXTENDED RELEASE ORAL
Status: DISCONTINUED | OUTPATIENT
Start: 2020-12-10 | End: 2020-12-10 | Stop reason: HOSPADM

## 2020-12-10 RX ORDER — NALOXONE HYDROCHLORIDE 0.4 MG/ML
0.2 INJECTION, SOLUTION INTRAMUSCULAR; INTRAVENOUS; SUBCUTANEOUS
Status: DISCONTINUED | OUTPATIENT
Start: 2020-12-10 | End: 2020-12-10 | Stop reason: HOSPADM

## 2020-12-10 RX ORDER — LIDOCAINE 40 MG/G
CREAM TOPICAL
Status: DISCONTINUED | OUTPATIENT
Start: 2020-12-10 | End: 2020-12-10 | Stop reason: HOSPADM

## 2020-12-10 RX ORDER — SODIUM CHLORIDE 9 MG/ML
INJECTION, SOLUTION INTRAVENOUS CONTINUOUS
Status: CANCELLED | OUTPATIENT
Start: 2020-12-10

## 2020-12-10 RX ORDER — HEPARIN SODIUM 1000 [USP'U]/ML
INJECTION, SOLUTION INTRAVENOUS; SUBCUTANEOUS
Status: DISCONTINUED | OUTPATIENT
Start: 2020-12-10 | End: 2020-12-10 | Stop reason: HOSPADM

## 2020-12-10 RX ORDER — NITROGLYCERIN 0.4 MG/1
0.4 TABLET SUBLINGUAL EVERY 5 MIN PRN
Status: DISCONTINUED | OUTPATIENT
Start: 2020-12-10 | End: 2020-12-10 | Stop reason: HOSPADM

## 2020-12-10 RX ORDER — OXYCODONE HYDROCHLORIDE 5 MG/1
5 TABLET ORAL EVERY 4 HOURS PRN
Status: DISCONTINUED | OUTPATIENT
Start: 2020-12-10 | End: 2020-12-10 | Stop reason: HOSPADM

## 2020-12-10 RX ORDER — NITROGLYCERIN 5 MG/ML
VIAL (ML) INTRAVENOUS
Status: DISCONTINUED | OUTPATIENT
Start: 2020-12-10 | End: 2020-12-10 | Stop reason: HOSPADM

## 2020-12-10 RX ORDER — CLOPIDOGREL BISULFATE 75 MG/1
75 TABLET ORAL DAILY
Qty: 90 TABLET | Refills: 3 | Status: SHIPPED | OUTPATIENT
Start: 2020-12-11

## 2020-12-10 RX ORDER — METOPROLOL TARTRATE 1 MG/ML
5-10 INJECTION, SOLUTION INTRAVENOUS
Status: DISCONTINUED | OUTPATIENT
Start: 2020-12-10 | End: 2020-12-10 | Stop reason: HOSPADM

## 2020-12-10 RX ORDER — LIDOCAINE 40 MG/G
CREAM TOPICAL
Status: CANCELLED | OUTPATIENT
Start: 2020-12-10

## 2020-12-10 RX ORDER — ASPIRIN 81 MG/1
81 TABLET ORAL DAILY
Status: DISCONTINUED | OUTPATIENT
Start: 2020-12-11 | End: 2020-12-10 | Stop reason: HOSPADM

## 2020-12-10 RX ORDER — ONDANSETRON 4 MG/1
4 TABLET, ORALLY DISINTEGRATING ORAL EVERY 6 HOURS PRN
Status: DISCONTINUED | OUTPATIENT
Start: 2020-12-10 | End: 2020-12-10 | Stop reason: HOSPADM

## 2020-12-10 RX ORDER — FLUMAZENIL 0.1 MG/ML
0.2 INJECTION, SOLUTION INTRAVENOUS
Status: DISCONTINUED | OUTPATIENT
Start: 2020-12-10 | End: 2020-12-10 | Stop reason: HOSPADM

## 2020-12-10 RX ORDER — HYDRALAZINE HYDROCHLORIDE 20 MG/ML
10 INJECTION INTRAMUSCULAR; INTRAVENOUS EVERY 4 HOURS PRN
Status: DISCONTINUED | OUTPATIENT
Start: 2020-12-10 | End: 2020-12-10 | Stop reason: HOSPADM

## 2020-12-10 RX ORDER — CLOPIDOGREL BISULFATE 75 MG/1
75 TABLET ORAL DAILY
Status: DISCONTINUED | OUTPATIENT
Start: 2020-12-11 | End: 2020-12-10 | Stop reason: HOSPADM

## 2020-12-10 RX ORDER — FENTANYL CITRATE 50 UG/ML
INJECTION, SOLUTION INTRAMUSCULAR; INTRAVENOUS
Status: DISCONTINUED | OUTPATIENT
Start: 2020-12-10 | End: 2020-12-10 | Stop reason: HOSPADM

## 2020-12-10 RX ORDER — FENTANYL CITRATE 50 UG/ML
25-50 INJECTION, SOLUTION INTRAMUSCULAR; INTRAVENOUS
Status: ACTIVE | OUTPATIENT
Start: 2020-12-10 | End: 2020-12-10

## 2020-12-10 RX ORDER — OXYCODONE HYDROCHLORIDE 10 MG/1
10 TABLET ORAL EVERY 4 HOURS PRN
Status: DISCONTINUED | OUTPATIENT
Start: 2020-12-10 | End: 2020-12-10 | Stop reason: HOSPADM

## 2020-12-10 RX ORDER — POTASSIUM CHLORIDE 750 MG/1
40 TABLET, EXTENDED RELEASE ORAL
Status: DISCONTINUED | OUTPATIENT
Start: 2020-12-10 | End: 2020-12-10 | Stop reason: HOSPADM

## 2020-12-10 RX ORDER — ATROPINE SULFATE 0.1 MG/ML
0.5 INJECTION INTRAVENOUS
Status: DISCONTINUED | OUTPATIENT
Start: 2020-12-10 | End: 2020-12-10 | Stop reason: HOSPADM

## 2020-12-10 RX ORDER — POTASSIUM CHLORIDE 1500 MG/1
20 TABLET, EXTENDED RELEASE ORAL
Status: CANCELLED | OUTPATIENT
Start: 2020-12-10

## 2020-12-10 RX ORDER — IOPAMIDOL 755 MG/ML
INJECTION, SOLUTION INTRAVASCULAR
Status: DISCONTINUED | OUTPATIENT
Start: 2020-12-10 | End: 2020-12-10 | Stop reason: HOSPADM

## 2020-12-10 RX ADMIN — SODIUM CHLORIDE: 9 INJECTION, SOLUTION INTRAVENOUS at 15:45

## 2020-12-10 RX ADMIN — ASPIRIN 325 MG: 325 TABLET, COATED ORAL at 15:45

## 2020-12-10 ASSESSMENT — MIFFLIN-ST. JEOR: SCORE: 1457.06

## 2020-12-10 NOTE — Clinical Note
Stent deployed in the right coronary artery. Max pressure = 12 laquita. Total duration = 10 seconds.

## 2020-12-10 NOTE — PATIENT INSTRUCTIONS
Thank you for seeing the Palliative Care Clinic today.     Return in clinic as needed for a follow-up.      You can reach the Palliative Care Team during business hours at the following numbers:   -For the [facility], call [phone]  -To reach the palliative RN for questions or refills, call [phone]    To reach the Palliative Care Provider on-call After-hours or on holidays and weekends, call: [phone].  Please note that we are not able to provide pain medication refills on evenings or weekends.

## 2020-12-10 NOTE — PROGRESS NOTES
Patient arrived on 2A for coronary angiogram with planned intervention.  IV placed.  Labs all done yesterday.  Groin prepped and pulses marked yesterday - no further intervention needed.  ASA given.  Awaiting consent, otherwise prep complete.

## 2020-12-10 NOTE — Clinical Note
The first balloon was inserted into the right coronary artery.Max pressure = 14 laquita. Total duration = 10 seconds.      Balloon reinflated a second time:

## 2020-12-10 NOTE — Clinical Note
The first balloon was inserted into the other vessel and RV ping.Max pressure = 10 laquita. Total duration = 12 seconds.

## 2020-12-10 NOTE — TELEPHONE ENCOUNTER
Contacted Archie following Lung Transplant Committee meeting.  Asked how he is doing. He expressed shock regarding the fact that coronary artery disease was found yesterday, he did not expect this.   Confirmed that this finding was discussed this morning by the transplant committee.  Explained that the committee is very concerned about his CAD which would put him at higher risk for transplant, especially in someone over age 70--which has been discussed with him before.  Even if his heart disease is repaired, this is outside the transplant program's guidelines for candidacy.  He is not a candidate for transplant.     Also discussed that due to his underlying lung disease, the team thought that stenting rather than bypass surgery would be the better option.   Dr. Humphrey will be contacting him to discuss further.     Urged him to proceed with Palliative consult as scheduled.  Will alert Palliative to team decision.  Will cancel remainder of evaluation except for nurse closure visit to discuss the test results, and answer any additional questions.    Urged him to be diligent about using his oxygen with any activity as prescribed to protect his heart.   Discussed that it is possible that stenting will help with some of his symptoms of shortness of breath, but that is not always true.      Archie will call with any questions, and will meet with him as scheduled on Tuesday.

## 2020-12-10 NOTE — PLAN OF CARE
D/I/A:  Patient is tolerating liquids and foods, ambulating, urinating, right neck and right wrist puncture sites are stable (no bleeding and no hematoma) and patient has a  (wife).  A+O x4 and making needs known.  CCL access sites C/D/I; no bleeding or hematoma; CMS intact.  VSS.  SR on monitor.  IV access removed.  Education completed and outlined in AVS or handout: medications reviewed with patient. Plavix picked up at discharge pharmacy. Patient stated he already had aspirin at home. Discussed site care, activity restrictions, signs to watch for, and when to call the MD if needed.  Questions answered prior to discharge.  Belongings returned to patient at discharge.    P: Discharged to self care. Wife to be with patient for next 24 hours.  Patient to follow up with appts as per discharge instruction.    TR band removed from right wrist without complication. Site CDI, no bleeding or hematoma. Slight bruising. Right neck CDI. Stable for discharge.

## 2020-12-10 NOTE — Clinical Note
The first balloon was inserted into the right coronary artery.Max pressure = 12 laquita. Total duration = 10 seconds.     Max pressure = 12 laquita. Total duration = 10 seconds.    Balloon reinflated a second time: Max pressure = 12 laquita. Total duration = 10 seconds. Max pressure = 12 laquita. Total duration = 10 seconds.

## 2020-12-10 NOTE — Clinical Note
Antiplatelet agents where not given. Patient was pre-loaded with 600mg Plavix yesterday, 12/9/2020, and received 75mg today

## 2020-12-10 NOTE — Clinical Note
The first balloon was inserted into the right coronary artery.Max pressure = 12 laquita. Total duration = 10 seconds.     Max pressure = 12 laquita. Total duration = 10 seconds.    Balloon reinflated a second time: Max pressure = 12 laquita. Total duration = 10 seconds.  Balloon reinflated a third time: Max pressure = 12 laquita. Total duration = 10 seconds.

## 2020-12-10 NOTE — PRE-PROCEDURE
GENERAL PRE-PROCEDURE:   Procedure:  Coronary angiogram, possible percutaneous coronary intervention    Written consent obtained?: Yes    Risks and benefits: Risks, benefits and alternatives were discussed    Consent given by:  Patient  Patient states understanding of procedure being performed: Yes    Patient's understanding of procedure matches consent: Yes    Procedure consent matches procedure scheduled: Yes    Expected level of sedation:  Moderate  Appropriately NPO:  Yes  ASA Class:  Class 1- healthy patient  Heart:  Normal heart sounds and rate  Statement of review:  I have reviewed the lab findings, diagnostic data, medications, and the plan for sedation

## 2020-12-10 NOTE — Clinical Note
The first balloon was inserted into the right coronary artery.Max pressure = 12 laquita. Total duration = 10 seconds.

## 2020-12-10 NOTE — COMMITTEE REVIEW
Committee Review Note      Evaluation Date: 12/3/2020  Committee Review Date: 12/10/2020    Organ being evaluated for: Lung    Transplant Phase: Evaluation  Transplant Status: Active    Transplant Coordinator: Gloria Cloud  Transplant Surgeon:       Referring Physician: Zuleima Moody    Primary Diagnosis:   Secondary Diagnosis:     Committee Review Members:  Nurse Practitioner - Adult Health Lisa Dai NP   Nutrition Donna Abrams, RD   Pharmacy Patric Lipscomb, [facility]   Pulmonary Disease Ashok Mitchell MD, Sandor Camp MD, Gladis Mcdaniel, Balta Nuñez MD, Evan Mr Daniel MD, Brandee Walls MD, Jamshid Sykes MD, Ronnell Cueva MD, Pebbles Burden MD, Cali Parkinson MD, Jayna Gomes MD, Usman Richard MD, Dorita Souza MD    - Clinical Ama Sandoval, [facility], Robert Smith, [facility]   Speech Language/Path Rica Kinsey, SLP   Transplant Masha Ricks, RN, Susan Pickering, RN, Gloria Cloud, RN, Latanya Burris, JACKELIN, Ethel Giraldo, RN, Griselda Farrell, RN, Lor Roman, RN, Liam Tucker, RN, NATALIE SALMERON, JACKELIN   Transplant Surgery Sean Wasserman MD, Jw Lester MD, Ever Bright MD       Transplant Eligibility:     Committee Review Decision: Declined    Relative Contraindications:     Absolute Contraindications: Severe, untreated coronary, cerebral, or peripheral vascular disease    Committee Chair Cali Parkinson MD verbally attested to the committee's decision.    Committee Discussion Details: 71 yo with history of ILD, possible CKD currently being evaluated for lung transplant.   Coronary angiogram yesterday revealed multivessel CAD.  Options include 4 vessel CABG vs multivessel PCI.   Per team discussion, risk with underlying lung disease would be increased with CABG, would recommend PCI.     On behalf of lung transplant consideration, CAD makes him too high risk for lung  transplant and is outside guidelines for a candidate age 70 or older.   Not a candidate for lung transplant.

## 2020-12-10 NOTE — LETTER
"12/10/2020       RE: Eric Victor  [address]  [place] [address]     Dear Colleague,    Thank you for referring your patient, Eric Victor, to the [facility] at [facility]. Please see a copy of my visit note below.    Eric Victor is a 70 year old male who is being evaluated via a billable video visit.      The patient has been notified of following:     \"This video visit will be conducted via a call between you and your physician/provider. We have found that certain health care needs can be provided without the need for an in-person physical exam.  This service lets us provide the care you need with a video conversation.  If a prescription is necessary we can send it directly to your pharmacy.  If lab work is needed we can place an order for that and you can then stop by our lab to have the test done at a later time.    Video visits are billed at different rates depending on your insurance coverage.  Please reach out to your insurance provider with any questions.    If during the course of the call the physician/provider feels a video visit is not appropriate, you will not be charged for this service.\"    Patient has given verbal consent for Video visit? Yes  How would you like to obtain your AVS? MyChart  If you are dropped from the video visit, the video invite should be resent to: Text to cell phone: [phone]  Will anyone else be joining your video visit? No    Vitals - Patient Reported  Weight (Patient Reported): 69.4 kg (153 lb)  Pain Score: No Pain (0)    I have reviewed and updated patient's allergy and medication list.    Concerns: NONE  Refills: NONE    Suki Suárez [facility]    Video-Visit Details    Type of service:  Video Visit    Video Start Time: 10:39 AM  Video End Time: 11:14 AM    Originating Location (pt. Location): Home    Distant Location (provider location):  [facility] " "    Platform used for Video Visit: [facility]    Palliative Care Outpatient Clinic Consultation Note    Patient:  Eric Victor    Chief Complaint:   Eric Victor 70 year old male who is presenting to the palliative medicine clinic today at the request of Dr. Souza for a palliative care consultation secondary to ILD.   The patient's primary care provider is:  Michelle Kent     History of Present Illness:  Eric Victor is a 70 year old with ILD being evaluated for lung transplant, which he found out today is not a candidate due to coronary artery disease, seen to establish palliative care.  He is joined by his wife Lor    He was diagnosed with IPF in 2009, and at the time was very active and had to stop running marathons at age 60.  He had a severe pneumonia in 2012 requiring 2 weeks of mechanical ventilation, but was able to successfully rehab to an active lifestyle again.  He had had a decline in 2019 and on in February 2020 was started on Ofev, and had about 3 or 4 prednisone bursts for worsening respiratory function.  Up until the spring he was able to mow his lawn with a push mower, walk at a brisk pace, and do light snow shoveling.  Since the end of the summer he is mostly been shuffling when he walks, but has maintained walking 30 to 40 minutes a day slowly.  He wears 2 L of oxygen at night.  He does not need any assistance with walking.  He notes feeling a little more short of breath with bathing and dressing, but does not have to rest.  He is able to vacuum and do light housework.    Mild nausea with the Ofev, good appetite.  Diarrhea is manageable on his current dose.  Sleeps pretty well.  Stays upbeat, no depressed mood or anxiety    Patient's Disease Understanding: Describes that his expectation is he would continue to slow down until he was mostly in the chair, and likely would need to wear oxygen all the time then, \" and then the morphine.\"    Coping: Says has good support from his " family and very close friends.  Tries to keep a positive attitude.  He wants to keep living as long as he has a good quality of life, which he defines as being able to go to the cabin every week and play with his grandkids.    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   Substance Use Topics     Alcohol use: Yes     Frequency: 2-4 times a month     Drinks per session: 1 or 2     Binge frequency: Never     Drug use: Never       Family History  Family History   Problem Relation Age of Onset     Lupus Mother      Alzheimer Disease Mother         passed away in her 80's      Raynaud syndrome Mother      Liver Cancer Father         passed away from liver cancer in his mid 80's     Bone Cancer Father      Prostate Cancer Father      Patient's Involvement with Prior History of Serious Illness in Family: Has not had anybody in his family with lung or heart disease    Advance Care Planning:  Advance Directive:    None on file.  Says has a living will, wife and middle son would be who he trusts to make medical decisions    No Known Allergies     No current outpatient medications on file.     Past Medical History:   Diagnosis Date     Coronary atherosclerosis of native coronary vessel 11/11/2020     History of blood transfusion [facility]     Uncomplicated asthma      Past Surgical History:   Procedure Laterality Date     BIOPSY  2018    colon polyp, [place]     CL AFF  SURGICAL PATHOLOGY  2009    [facility]  2018     ORTHOPEDIC SURGERY  1990    hand       REVIEW OF SYSTEMS:   ROS: 10 point ROS neg other than the symptoms noted above in the HPI and here:  Palliative Symptom Review (0=no symptom/no concern, 1=mild, 2=moderate, 3=severe):      Pain: 0      Fatigue: 1-2      Nausea: 1      Constipation: 0      Diarrhea: 10      Depressive Symptoms: 0      Anxiety: 0      Drowsiness: 0      Poor Appetite: 0      Shortness of Breath: 1-2      Insomnia: 0      Other: 0      Overall (0 good/no concerns, 3 very poor): 0    There were no vitals taken for this visit.    GENERAL: Comfortable-appearing, alert and no distress  EYES: Eyes grossly normal to inspection, conjunctivae and sclerae normal  Hearing intact.   RESP: no audible wheeze, cough, or visible cyanosis.  No increased work of breathing on RA.  Able to speak easily in complete sentences.  SKIN: no suspicious lesions or rashes on exposed skin  NEURO: Cranial nerves grossly intact, mentation intact and speech normal.  No tremor.   PSYCH: mentation appears normal, affect normal/bright, judgement and insight intact, normal speech and appearance   The rest of a comprehensive physical examination is deferred due to PHE (public health emergency) video visit restrictions  Wt Readings from Last 10 Encounters:   12/09/20 69.8 kg (153 lb 14.4 oz)   12/08/20 69.8 kg (153 lb 14.4 oz)   10/02/20 69.9 kg (154 lb)   06/05/13 70.3 kg (155 lb)   02/27/13 74.8 kg (165 lb)   01/02/13 74.1 kg (163 lb 4.8 oz)       Data Reviewed:  LABS:   Recent Labs   Lab Test 12/09/20  1028 12/07/20  1016    140   POTASSIUM 4.1 3.7   CHLORIDE 104 103   CO2 30 32   ANIONGAP 3 5   GLC 96 91   BUN 22 23   CR 1.00 1.21   ADAL 9.0 9.0     GFR 75  WBC 12.2, Hb 15.5    IMAGING:   CT chest 12/7/2020-extensive fibrosis with honeycombing, consistent with IPF and UIP pattern.  Masslike consolidation in the left upper lobe 4.8 x 2.5 cm, numerous areas of  nodular consolidation bilaterally,  mediastinal and hilar adenopathy present.    Echo 12/7/2020-LV function normal with EF 55 to 60%.  Mild RV dilation, normal RV function.  Mildly positive bubble study suggestive of very small PFO.    Impressions, Recommendations & Counseling:  Palliative Performance Score: 80  Decision Making Capacity: Intact    Eric Victor is a 70 year old with IPF seen for early palliative care.  I introduced the palliative care team and our role in maximizing quality of life, including symptom management, psychosocial and spiritual support, and help making medical decisions and planning for the future.  His primary symptom is shortness of breath with ambulation, and though he has noticed a decrease in his speed of walking and endurance, he is able to walk 30 to 40 minutes at a slow pace.    Shortness of breath-he has never had pulmonary rehab.  Discussed this would be recommended at some point in the future to help improve his endurance and can help shortness of breath  -I addressed the issue of morphine, and that it can be used at low doses when shortness of breath is becoming limiting to do ADLs.  I do not believe this is needed yet    Advance care planning-he has a good understanding of his current disease and feels he gets good information from his pulmonologist.  He is completed a living will.  Recommended reviewing periodically to make sure he still agrees with what he has written.   - He says his primary goal is to keep living, not just be alive.  A good quality of life to him is being able to go to his cabin and play with his grandchildren.    - He has several questions about the ability to get mobile oxygen to allow him to go to his cabin, which I was not able to answer.  When that time comes, I said talking to his oxygen supplier, insurance, and pulmonologist to help work out what would be the best solution to meet his goals.    - He would be amenable to treating reversible  illnesses, and may get a stent for this coronary artery disease.    - He says ultimately were time short he would prefer his care to be at home.  I introduced the idea of a POLST to make his wishes known about emergency end-of-life care in the future, and this could be completed with his primary care or pulmonologist    I gave him our contact information, and will follow up on an as-needed basis if further symptom management or care planning needs arise.    Margaret Travis MD  Palliative Medicine  Pager [phone]     (This note was transcribed using voice recognition software. While I review and edit the transcription, I may miss errors, and the software sometimes does unexpected capitalizations and formatting that I miss. Please let me know of any serious mistranscriptions and I will addend this note.)

## 2020-12-11 LAB
A* LOCUS: NORMAL
A*: NORMAL
ABTEST METHOD: NORMAL
B* LOCUS: NORMAL
B*: NORMAL
BW-1: NORMAL
BW-2: NORMAL
C* LOCUS: NORMAL
C*: NORMAL
COTININE SERPL-MCNC: <1 NG/ML
DPA1* LOCUS NMDP: NORMAL
DPA1* NMDP: NORMAL
DPA1*: NORMAL
DPA1*LOCUS: NORMAL
DPB1* LOCUS NMDP: NORMAL
DPB1* NMDP: NORMAL
DPB1*: NORMAL
DPB1*LOCUS: NORMAL
DQA1*: NORMAL
DQA1*LOCUS: NORMAL
DQB1* LOCUS: NORMAL
DRB1* LOCUS: NORMAL
DRB1*: NORMAL
DRB3* LOCUS: NORMAL
DRB4*: NORMAL
DRSSO TEST METHOD: NORMAL
HEMOCCULT STL QL IA: NEGATIVE
INTERPRETATION ECG - MUSE: NORMAL
NICOTINE SERPL-MCNC: <1 NG/ML

## 2020-12-11 NOTE — DISCHARGE INSTRUCTIONS
Going Home after an Angioplasty or Stent Placement (Cardiac)  ______________________________________________      After you go home:    Have an adult stay with you for 24 hours.    Drink plenty of fluids.    You may eat your normal diet, unless your doctor tells you otherwise.    For 24 hours:    Relax and take it easy.    Do NOT smoke.    Do NOT make any important or legal decisions.    Do NOT drive or operate machines at home or at work.    Do NOT drink alcohol.    Remove the Band-Aid after 24 hours. If there is minor oozing, apply another Band-aid and remove it after 12 hours.    For 2 days, do NOT have sex or do any heavy exercise.    Do NOT take a bath, or use a hot tub or pool for at least 3 days. You may shower.    Care of groin site  It is normal to have a small bruise or lump at the site.    Do not scrub the site.    For the first 2 days: Do not stoop or squat. When you cough, sneeze or move your bowels, hold your hand over the puncture site and press gently.    Do not lift more than 10 pounds for at least 3 to 5 days.    Do not use lotion or powder near the puncture site for 3 days.    If you start bleeding from the site in your groin, lie down flat and press firmly  on the site. Call your doctor as soon as you c  Medicines    If you have started taking Plavix or Effient, do not stop taking it until you talk to your heart doctor (cardiologist).    If you are on metformin (Glucophage), do not restart it until you have blood tests (within 2 to 3 days after discharge). When your doctor tells you it is safe, you may restart the metformin.    If you have stopped any other medicines, check with your nurse or provider about when to restart them.    Call 911 right away if you have bleeding that is heavy or does not stop.    Call your doctor if:    You have a large or growing hard lump around the site.    The site is red, swollen, hot or tender.    Blood or fluid is draining from the site.    You have chills or a  fever greater than 101 F (38 C).    Your leg or arm feels numb or cool.    You have hives, a rash or unusual itching.      [facility] [place]:  [phone] (7 days a week)

## 2020-12-11 NOTE — PLAN OF CARE
D/from CCL post PCI RCA with 3 stents, still needs LAD to be done at a later time. Right groin angiosealed and flat and soft.   I/instructed on bedrest rules  D/voided Drank fluids and ate some cookies Site flat and soft with good pulses  He has Plavix at home and will take what he has. RN brought to front to family to drive home via wheelchair. He will wait for a call when they will do the rest of PCI work.  I told him it is usually about a week

## 2020-12-17 PROBLEM — I25.110 CORONARY ARTERY DISEASE INVOLVING NATIVE HEART WITH UNSTABLE ANGINA PECTORIS, UNSPECIFIED VESSEL OR LESION TYPE (H): Status: ACTIVE | Noted: 2020-12-17

## 2020-12-18 ENCOUNTER — TELEPHONE (OUTPATIENT)
Dept: TRANSPLANT | Facility: CLINIC | Age: 70
End: 2020-12-18

## 2020-12-18 NOTE — LETTER
December 18, 2020    Eric Victor  [address]  [facility] [address]      Dear Mr. Victor,   The purpose of this letter is to let you know that on 12/10/2020 the [facility] Multi-Disciplinary Selection Team reviewed the results of your transplant evaluation.  Based on the results of your evaluation and the selection criteria used by our program , the decision was made to not list you on the lung transplant list.  This is because of the extent of newly found coronary artery disease, which is currently being treated.   Important things you should know:    If you would like to discuss the decision, or if your medical status changes you may schedule a return visits with your doctor by calling [phone] and asking to speak to your transplant coordinator.    We recommend that you continue to follow up with your primary care doctor in order to manage your health concerns.  Enclosed is a letter from [facility] which describes the services offered to patients by [facility] and the Organ Procurement and Transplantation Network.  Thank you for allowing us to participate in your care.  We wish you well.  Sincerely,        Solid Organ Transplant  [facility]    Enclosures: OS Letter  cc: Care Team            The Organ Procurement and Transplantation Network  Toll-free patient services line:     Your resource for organ transplant information    If you have a question regarding your own medical care, you always should call your transplant hospital first. However, for general organ transplant-related information, you can call the Organ Procurement and Transplantation Network (OPTN) toll-free patient services line at [phone]. Anyone, including potential transplant candidates, candidates, recipients, family members, friends, living donors, and donor family members, can call this number to:          Talk about organ  donation, living donation, the transplant process, the donation process, and transplant policies.    Get a free patient information kit with helpful booklets, waiting list and transplant information, and a list of all transplant hospitals.    Ask questions about the OPTN website (https://optn.transplant.hrsa.gov/), the United Network for Organ Sharing s (UNOS) website (https://unos.org/), or the UNOS website for living donors and transplant recipients. (https://www.transplantliving.org/).    Learn how the OPTN can help you.    Talk about any concerns that you may have with a transplant hospital.    The [facility] transplant system, the OPTN, is managed under federal contract by the United Network for Organ Sharing (UNOS), which is a non-profit charitable organization. The OPTN helps create and define organ sharing policies that make the best use of donated organs. This process continuously evaluating new advances and discoveries so policies can be adapted to best serve patients waiting for transplants. To do so, the OPTN works closely with transplant professionals, transplant patients, transplant candidates, donor families, living donors, and the public. All transplant programs and organ procurement organizations throughout the country are OPTN members and are obligated to follow the policies the OPTN creates for allocating organs.    The OPTN also is responsible for:      Providing educational material for patients, the public, and professionals.    Raising awareness of the need for donated organs and tissue.    Coordinating organ procurement, matching, and placement.    Collecting information about every organ transplant and donation that occurs in the [place].    Remember, you should contact your transplant hospital directly if you have questions or concerns about your own medical care including medical records, work-up progress, and test results.    We are not your transplant hospital, and our staff will not  be able to answer questions about your case, so please keep your transplant hospital s phone number handy.    However, while you research your transplant needs and learn as much as you can about transplantation and donation, we welcome your call to our toll-free patient services line at [phone].          Updated 4/1/2019

## 2020-12-18 NOTE — TELEPHONE ENCOUNTER
Contacted Archie to confirm that the results of his 24 hr pH and manometry study are back.  His 24 hr pH study had relatively normal results--did not discuss the weakly acidic reflux noted.  Also discussed that the esophageal function/manometry had some abnormalities that may benefit from additional testing. Confirmed that Dr. Kay office should be reaching out to schedule consult.    Confirmed that Dr. Souza recommends deferring any additional follow-out for chest CT to his primary pulmonologist, Dr. Moreira.  This writer will contact her office to discuss results and recommendation.     Confirmed that when a candidate is declined for transplant, a letter is always sent to patient and referring physicians.  Archie will receive a letter via [facility] and his physicians will get a copy.  Archie said he appreciates this.    Asked Archie how he is doing.  He said he feels microscopically better.  He is hopeful the next procedure scheduled in January may bring some additional benefit.    No further questions.

## 2020-12-29 PROBLEM — I25.42 DISSECTION OF CORONARY ARTERY: Status: ACTIVE | Noted: 2020-12-17

## 2020-12-29 PROBLEM — I51.89 OTHER ILL-DEFINED HEART DISEASES: Status: ACTIVE | Noted: 2020-12-17

## 2020-12-30 DIAGNOSIS — Z11.59 ENCOUNTER FOR SCREENING FOR OTHER VIRAL DISEASES: ICD-10-CM

## 2021-01-04 LAB
FUNGUS SPEC CULT: ABNORMAL
FUNGUS SPEC CULT: ABNORMAL
SPECIMEN SOURCE: ABNORMAL

## 2021-01-06 ENCOUNTER — PATIENT OUTREACH (OUTPATIENT)
Dept: GASTROENTEROLOGY | Facility: CLINIC | Age: 71
End: 2021-01-06

## 2021-01-11 DIAGNOSIS — Z11.59 ENCOUNTER FOR SCREENING FOR OTHER VIRAL DISEASES: ICD-10-CM

## 2021-01-11 PROCEDURE — U0005 INFEC AGEN DETEC AMPLI PROBE: HCPCS | Performed by: INTERNAL MEDICINE

## 2021-01-11 PROCEDURE — U0003 INFECTIOUS AGENT DETECTION BY NUCLEIC ACID (DNA OR RNA); SEVERE ACUTE RESPIRATORY SYNDROME CORONAVIRUS 2 (SARS-COV-2) (CORONAVIRUS DISEASE [COVID-19]), AMPLIFIED PROBE TECHNIQUE, MAKING USE OF HIGH THROUGHPUT TECHNOLOGIES AS DESCRIBED BY CMS-2020-01-R: HCPCS | Performed by: INTERNAL MEDICINE

## 2021-01-12 LAB
SARS-COV-2 RNA RESP QL NAA+PROBE: NOT DETECTED
SPECIMEN SOURCE: NORMAL

## 2021-01-13 ENCOUNTER — TELEPHONE (OUTPATIENT)
Dept: CARDIOLOGY | Facility: CLINIC | Age: 71
End: 2021-01-13

## 2021-01-13 NOTE — TELEPHONE ENCOUNTER
Left voicemail for patient to complete Travel Screen for Cardiac Cath Lab appointment on 1/14 and inform patient of updated Visitor Policy.       covid neg

## 2021-01-14 ENCOUNTER — HOSPITAL ENCOUNTER (OUTPATIENT)
Facility: CLINIC | Age: 71
Discharge: HOME | End: 2021-01-14
Attending: INTERNAL MEDICINE | Admitting: INTERNAL MEDICINE
Payer: MEDICARE

## 2021-01-14 ENCOUNTER — APPOINTMENT (OUTPATIENT)
Dept: MEDSURG UNIT | Facility: CLINIC | Age: 71
End: 2021-01-14
Attending: INTERNAL MEDICINE
Payer: MEDICARE

## 2021-01-14 ENCOUNTER — APPOINTMENT (OUTPATIENT)
Dept: LAB | Facility: CLINIC | Age: 71
End: 2021-01-14
Attending: INTERNAL MEDICINE
Payer: MEDICARE

## 2021-01-14 VITALS
HEART RATE: 58 BPM | OXYGEN SATURATION: 100 % | BODY MASS INDEX: 21 KG/M2 | DIASTOLIC BLOOD PRESSURE: 100 MMHG | SYSTOLIC BLOOD PRESSURE: 147 MMHG | TEMPERATURE: 98.1 F | WEIGHT: 150 LBS | RESPIRATION RATE: 16 BRPM | HEIGHT: 71 IN

## 2021-01-14 DIAGNOSIS — Z98.890 STATUS POST CORONARY ANGIOGRAM: ICD-10-CM

## 2021-01-14 DIAGNOSIS — J84.9 LUNG DISEASE, INTERSTITIAL (H): ICD-10-CM

## 2021-01-14 DIAGNOSIS — I25.10 CORONARY ATHEROSCLEROSIS OF NATIVE CORONARY VESSEL: ICD-10-CM

## 2021-01-14 DIAGNOSIS — I25.10 CORONARY ARTERY DISEASE INVOLVING NATIVE CORONARY ARTERY OF NATIVE HEART WITHOUT ANGINA PECTORIS: ICD-10-CM

## 2021-01-14 DIAGNOSIS — I25.110 CORONARY ARTERY DISEASE INVOLVING NATIVE HEART WITH UNSTABLE ANGINA PECTORIS, UNSPECIFIED VESSEL OR LESION TYPE (H): ICD-10-CM

## 2021-01-14 DIAGNOSIS — I77.810 MILD DILATION OF ASCENDING AORTA (H): ICD-10-CM

## 2021-01-14 DIAGNOSIS — Z86.79 HISTORY OF ATRIAL FIBRILLATION: Primary | ICD-10-CM

## 2021-01-14 DIAGNOSIS — I25.42 DISSECTION OF CORONARY ARTERY: ICD-10-CM

## 2021-01-14 DIAGNOSIS — Z76.82 ORGAN TRANSPLANT CANDIDATE: ICD-10-CM

## 2021-01-14 DIAGNOSIS — J84.9 ILD (INTERSTITIAL LUNG DISEASE) (H): ICD-10-CM

## 2021-01-14 DIAGNOSIS — I51.89 OTHER ILL-DEFINED HEART DISEASES: ICD-10-CM

## 2021-01-14 DIAGNOSIS — R93.89 ABNORMAL CT OF THE CHEST: ICD-10-CM

## 2021-01-14 DIAGNOSIS — I73.00 RAYNAUD'S DISEASE WITHOUT GANGRENE: ICD-10-CM

## 2021-01-14 LAB
ANION GAP SERPL CALCULATED.3IONS-SCNC: 4 MMOL/L (ref 3–14)
BUN SERPL-MCNC: 29 MG/DL (ref 7–30)
CALCIUM SERPL-MCNC: 9.6 MG/DL (ref 8.5–10.1)
CHLORIDE SERPL-SCNC: 101 MMOL/L (ref 94–109)
CHOLEST SERPL-MCNC: 208 MG/DL
CO2 SERPL-SCNC: 32 MMOL/L (ref 20–32)
CREAT SERPL-MCNC: 1.12 MG/DL (ref 0.66–1.25)
ERYTHROCYTE [DISTWIDTH] IN BLOOD BY AUTOMATED COUNT: 12.7 % (ref 10–15)
GFR SERPL CREATININE-BSD FRML MDRD: 65 ML/MIN/{1.73_M2}
GLUCOSE SERPL-MCNC: 86 MG/DL (ref 70–99)
HCT VFR BLD AUTO: 48.6 % (ref 40–53)
HDLC SERPL-MCNC: 56 MG/DL
HGB BLD-MCNC: 16 G/DL (ref 13.3–17.7)
KCT BLD-ACNC: 266 SEC (ref 75–150)
KCT BLD-ACNC: 314 SEC (ref 75–150)
LDLC SERPL CALC-MCNC: 142 MG/DL
MCH RBC QN AUTO: 33 PG (ref 26.5–33)
MCHC RBC AUTO-ENTMCNC: 32.9 G/DL (ref 31.5–36.5)
MCV RBC AUTO: 100 FL (ref 78–100)
NONHDLC SERPL-MCNC: 152 MG/DL
PLATELET # BLD AUTO: 211 10E9/L (ref 150–450)
POTASSIUM SERPL-SCNC: 4.4 MMOL/L (ref 3.4–5.3)
RBC # BLD AUTO: 4.85 10E12/L (ref 4.4–5.9)
SODIUM SERPL-SCNC: 137 MMOL/L (ref 133–144)
TRIGL SERPL-MCNC: 53 MG/DL
WBC # BLD AUTO: 12.5 10E9/L (ref 4–11)

## 2021-01-14 PROCEDURE — 36415 COLL VENOUS BLD VENIPUNCTURE: CPT | Performed by: NURSE PRACTITIONER

## 2021-01-14 PROCEDURE — 250N000013 HC RX MED GY IP 250 OP 250 PS 637: Performed by: INTERNAL MEDICINE

## 2021-01-14 PROCEDURE — 99214 OFFICE O/P EST MOD 30 MIN: CPT | Mod: 25 | Performed by: NURSE PRACTITIONER

## 2021-01-14 PROCEDURE — 999N000054 HC STATISTIC EKG NON-CHARGEABLE

## 2021-01-14 PROCEDURE — 99153 MOD SED SAME PHYS/QHP EA: CPT | Performed by: INTERNAL MEDICINE

## 2021-01-14 PROCEDURE — 999N000142 HC STATISTIC PROCEDURE PREP ONLY

## 2021-01-14 PROCEDURE — 85347 COAGULATION TIME ACTIVATED: CPT

## 2021-01-14 PROCEDURE — 93005 ELECTROCARDIOGRAM TRACING: CPT

## 2021-01-14 PROCEDURE — C9600 PERC DRUG-EL COR STENT SING: HCPCS | Performed by: INTERNAL MEDICINE

## 2021-01-14 PROCEDURE — 250N000011 HC RX IP 250 OP 636: Performed by: INTERNAL MEDICINE

## 2021-01-14 PROCEDURE — 85027 COMPLETE CBC AUTOMATED: CPT | Performed by: INTERNAL MEDICINE

## 2021-01-14 PROCEDURE — 250N000009 HC RX 250: Performed by: INTERNAL MEDICINE

## 2021-01-14 PROCEDURE — 36415 COLL VENOUS BLD VENIPUNCTURE: CPT | Performed by: INTERNAL MEDICINE

## 2021-01-14 PROCEDURE — 272N000001 HC OR GENERAL SUPPLY STERILE: Performed by: INTERNAL MEDICINE

## 2021-01-14 PROCEDURE — C1769 GUIDE WIRE: HCPCS | Performed by: INTERNAL MEDICINE

## 2021-01-14 PROCEDURE — 99152 MOD SED SAME PHYS/QHP 5/>YRS: CPT | Performed by: INTERNAL MEDICINE

## 2021-01-14 PROCEDURE — 80061 LIPID PANEL: CPT | Performed by: NURSE PRACTITIONER

## 2021-01-14 PROCEDURE — C1887 CATHETER, GUIDING: HCPCS | Performed by: INTERNAL MEDICINE

## 2021-01-14 PROCEDURE — C1894 INTRO/SHEATH, NON-LASER: HCPCS | Performed by: INTERNAL MEDICINE

## 2021-01-14 PROCEDURE — 93010 ELECTROCARDIOGRAM REPORT: CPT | Mod: 76 | Performed by: INTERNAL MEDICINE

## 2021-01-14 PROCEDURE — 258N000003 HC RX IP 258 OP 636: Performed by: INTERNAL MEDICINE

## 2021-01-14 PROCEDURE — C1725 CATH, TRANSLUMIN NON-LASER: HCPCS | Performed by: INTERNAL MEDICINE

## 2021-01-14 PROCEDURE — 93571 IV DOP VEL&/PRESS C FLO 1ST: CPT | Performed by: INTERNAL MEDICINE

## 2021-01-14 PROCEDURE — 80048 BASIC METABOLIC PNL TOTAL CA: CPT | Performed by: INTERNAL MEDICINE

## 2021-01-14 PROCEDURE — C9601 PERC DRUG-EL COR STENT BRAN: HCPCS | Performed by: INTERNAL MEDICINE

## 2021-01-14 PROCEDURE — C1874 STENT, COATED/COV W/DEL SYS: HCPCS | Performed by: INTERNAL MEDICINE

## 2021-01-14 PROCEDURE — C1760 CLOSURE DEV, VASC: HCPCS | Performed by: INTERNAL MEDICINE

## 2021-01-14 DEVICE — STENT SYNERGY DRUG ELUTING 2.75X32MM  H7493926032270: Type: IMPLANTABLE DEVICE | Status: FUNCTIONAL

## 2021-01-14 DEVICE — STENT SYNERGY DRUG ELUTING 2.25X16MM  H7493926016220: Type: IMPLANTABLE DEVICE | Status: FUNCTIONAL

## 2021-01-14 RX ORDER — NALOXONE HYDROCHLORIDE 0.4 MG/ML
0.4 INJECTION, SOLUTION INTRAMUSCULAR; INTRAVENOUS; SUBCUTANEOUS
Status: DISCONTINUED | OUTPATIENT
Start: 2021-01-14 | End: 2021-01-14

## 2021-01-14 RX ORDER — DOBUTAMINE HYDROCHLORIDE 200 MG/100ML
2-20 INJECTION INTRAVENOUS CONTINUOUS PRN
Status: DISCONTINUED | OUTPATIENT
Start: 2021-01-14 | End: 2021-01-14 | Stop reason: HOSPADM

## 2021-01-14 RX ORDER — NITROGLYCERIN 0.4 MG/1
0.4 TABLET SUBLINGUAL EVERY 5 MIN PRN
Status: DISCONTINUED | OUTPATIENT
Start: 2021-01-14 | End: 2021-01-14 | Stop reason: HOSPADM

## 2021-01-14 RX ORDER — FLUMAZENIL 0.1 MG/ML
0.2 INJECTION, SOLUTION INTRAVENOUS
Status: DISCONTINUED | OUTPATIENT
Start: 2021-01-14 | End: 2021-01-14 | Stop reason: HOSPADM

## 2021-01-14 RX ORDER — FENTANYL CITRATE 50 UG/ML
25-50 INJECTION, SOLUTION INTRAMUSCULAR; INTRAVENOUS
Status: ACTIVE | OUTPATIENT
Start: 2021-01-14 | End: 2021-01-14

## 2021-01-14 RX ORDER — SODIUM CHLORIDE 9 MG/ML
INJECTION, SOLUTION INTRAVENOUS CONTINUOUS
Status: DISCONTINUED | OUTPATIENT
Start: 2021-01-14 | End: 2021-01-14 | Stop reason: HOSPADM

## 2021-01-14 RX ORDER — TIROFIBAN HYDROCHLORIDE 50 UG/ML
0.07 INJECTION INTRAVENOUS CONTINUOUS PRN
Status: DISCONTINUED | OUTPATIENT
Start: 2021-01-14 | End: 2021-01-14 | Stop reason: HOSPADM

## 2021-01-14 RX ORDER — DOPAMINE HYDROCHLORIDE 160 MG/100ML
2-20 INJECTION, SOLUTION INTRAVENOUS CONTINUOUS PRN
Status: DISCONTINUED | OUTPATIENT
Start: 2021-01-14 | End: 2021-01-14 | Stop reason: HOSPADM

## 2021-01-14 RX ORDER — NALOXONE HYDROCHLORIDE 0.4 MG/ML
0.2 INJECTION, SOLUTION INTRAMUSCULAR; INTRAVENOUS; SUBCUTANEOUS
Status: DISCONTINUED | OUTPATIENT
Start: 2021-01-14 | End: 2021-01-14 | Stop reason: HOSPADM

## 2021-01-14 RX ORDER — POTASSIUM CHLORIDE 750 MG/1
20 TABLET, EXTENDED RELEASE ORAL
Status: DISCONTINUED | OUTPATIENT
Start: 2021-01-14 | End: 2021-01-14 | Stop reason: HOSPADM

## 2021-01-14 RX ORDER — HEPARIN SODIUM 1000 [USP'U]/ML
INJECTION, SOLUTION INTRAVENOUS; SUBCUTANEOUS
Status: DISCONTINUED | OUTPATIENT
Start: 2021-01-14 | End: 2021-01-14 | Stop reason: HOSPADM

## 2021-01-14 RX ORDER — NITROGLYCERIN 20 MG/100ML
10-200 INJECTION INTRAVENOUS CONTINUOUS PRN
Status: DISCONTINUED | OUTPATIENT
Start: 2021-01-14 | End: 2021-01-14 | Stop reason: HOSPADM

## 2021-01-14 RX ORDER — HYDRALAZINE HYDROCHLORIDE 20 MG/ML
10 INJECTION INTRAMUSCULAR; INTRAVENOUS EVERY 4 HOURS PRN
Status: DISCONTINUED | OUTPATIENT
Start: 2021-01-14 | End: 2021-01-14 | Stop reason: HOSPADM

## 2021-01-14 RX ORDER — LIDOCAINE 40 MG/G
CREAM TOPICAL
Status: DISCONTINUED | OUTPATIENT
Start: 2021-01-14 | End: 2021-01-14 | Stop reason: HOSPADM

## 2021-01-14 RX ORDER — ASPIRIN 81 MG/1
81 TABLET, CHEWABLE ORAL ONCE
Status: DISCONTINUED | OUTPATIENT
Start: 2021-01-14 | End: 2021-01-14 | Stop reason: HOSPADM

## 2021-01-14 RX ORDER — NALOXONE HYDROCHLORIDE 0.4 MG/ML
0.2 INJECTION, SOLUTION INTRAMUSCULAR; INTRAVENOUS; SUBCUTANEOUS
Status: DISCONTINUED | OUTPATIENT
Start: 2021-01-14 | End: 2021-01-14

## 2021-01-14 RX ORDER — ARGATROBAN 1 MG/ML
350 INJECTION, SOLUTION INTRAVENOUS
Status: DISCONTINUED | OUTPATIENT
Start: 2021-01-14 | End: 2021-01-14 | Stop reason: HOSPADM

## 2021-01-14 RX ORDER — OXYCODONE HYDROCHLORIDE 5 MG/1
5 TABLET ORAL EVERY 4 HOURS PRN
Status: DISCONTINUED | OUTPATIENT
Start: 2021-01-14 | End: 2021-01-14 | Stop reason: HOSPADM

## 2021-01-14 RX ORDER — ATORVASTATIN CALCIUM 40 MG/1
40 TABLET, FILM COATED ORAL DAILY
Status: DISCONTINUED | OUTPATIENT
Start: 2021-01-14 | End: 2021-01-14 | Stop reason: HOSPADM

## 2021-01-14 RX ORDER — CLOPIDOGREL BISULFATE 75 MG/1
TABLET ORAL
Status: DISCONTINUED | OUTPATIENT
Start: 2021-01-14 | End: 2021-01-14 | Stop reason: HOSPADM

## 2021-01-14 RX ORDER — IOPAMIDOL 755 MG/ML
INJECTION, SOLUTION INTRAVASCULAR
Status: DISCONTINUED | OUTPATIENT
Start: 2021-01-14 | End: 2021-01-14 | Stop reason: HOSPADM

## 2021-01-14 RX ORDER — NITROGLYCERIN 5 MG/ML
VIAL (ML) INTRAVENOUS
Status: DISCONTINUED | OUTPATIENT
Start: 2021-01-14 | End: 2021-01-14 | Stop reason: HOSPADM

## 2021-01-14 RX ORDER — CLOPIDOGREL BISULFATE 75 MG/1
75 TABLET ORAL DAILY
Qty: 90 TABLET | Refills: 3 | COMMUNITY
Start: 2021-01-15

## 2021-01-14 RX ORDER — ONDANSETRON 2 MG/ML
4 INJECTION INTRAMUSCULAR; INTRAVENOUS EVERY 6 HOURS PRN
Status: DISCONTINUED | OUTPATIENT
Start: 2021-01-14 | End: 2021-01-14 | Stop reason: HOSPADM

## 2021-01-14 RX ORDER — ATORVASTATIN CALCIUM 40 MG/1
40 TABLET, FILM COATED ORAL DAILY
Qty: 90 TABLET | Refills: 3 | Status: SHIPPED | OUTPATIENT
Start: 2021-01-14

## 2021-01-14 RX ORDER — METOPROLOL TARTRATE 1 MG/ML
5-10 INJECTION, SOLUTION INTRAVENOUS
Status: DISCONTINUED | OUTPATIENT
Start: 2021-01-14 | End: 2021-01-14 | Stop reason: HOSPADM

## 2021-01-14 RX ORDER — ATROPINE SULFATE 0.1 MG/ML
0.5 INJECTION INTRAVENOUS
Status: DISCONTINUED | OUTPATIENT
Start: 2021-01-14 | End: 2021-01-14 | Stop reason: HOSPADM

## 2021-01-14 RX ORDER — EPTIFIBATIDE 2 MG/ML
2 INJECTION, SOLUTION INTRAVENOUS CONTINUOUS PRN
Status: DISCONTINUED | OUTPATIENT
Start: 2021-01-14 | End: 2021-01-14 | Stop reason: HOSPADM

## 2021-01-14 RX ORDER — ASPIRIN 81 MG/1
81 TABLET ORAL DAILY
Status: DISCONTINUED | OUTPATIENT
Start: 2021-01-15 | End: 2021-01-14 | Stop reason: HOSPADM

## 2021-01-14 RX ORDER — ONDANSETRON 4 MG/1
4 TABLET, FILM COATED ORAL EVERY 6 HOURS PRN
Status: DISCONTINUED | OUTPATIENT
Start: 2021-01-14 | End: 2021-01-14

## 2021-01-14 RX ORDER — POTASSIUM CHLORIDE 750 MG/1
40 TABLET, EXTENDED RELEASE ORAL
Status: DISCONTINUED | OUTPATIENT
Start: 2021-01-14 | End: 2021-01-14 | Stop reason: HOSPADM

## 2021-01-14 RX ORDER — OXYCODONE HYDROCHLORIDE 10 MG/1
10 TABLET ORAL EVERY 4 HOURS PRN
Status: DISCONTINUED | OUTPATIENT
Start: 2021-01-14 | End: 2021-01-14 | Stop reason: HOSPADM

## 2021-01-14 RX ORDER — NALOXONE HYDROCHLORIDE 0.4 MG/ML
0.4 INJECTION, SOLUTION INTRAMUSCULAR; INTRAVENOUS; SUBCUTANEOUS
Status: DISCONTINUED | OUTPATIENT
Start: 2021-01-14 | End: 2021-01-14 | Stop reason: HOSPADM

## 2021-01-14 RX ORDER — EPTIFIBATIDE 2 MG/ML
180 INJECTION, SOLUTION INTRAVENOUS EVERY 10 MIN PRN
Status: DISCONTINUED | OUTPATIENT
Start: 2021-01-14 | End: 2021-01-14 | Stop reason: HOSPADM

## 2021-01-14 RX ORDER — FENTANYL CITRATE 50 UG/ML
INJECTION, SOLUTION INTRAMUSCULAR; INTRAVENOUS
Status: DISCONTINUED | OUTPATIENT
Start: 2021-01-14 | End: 2021-01-14 | Stop reason: HOSPADM

## 2021-01-14 RX ORDER — ARGATROBAN 1 MG/ML
150 INJECTION, SOLUTION INTRAVENOUS
Status: DISCONTINUED | OUTPATIENT
Start: 2021-01-14 | End: 2021-01-14 | Stop reason: HOSPADM

## 2021-01-14 RX ORDER — ONDANSETRON 4 MG/1
4 TABLET, ORALLY DISINTEGRATING ORAL EVERY 6 HOURS PRN
Status: DISCONTINUED | OUTPATIENT
Start: 2021-01-14 | End: 2021-01-14 | Stop reason: HOSPADM

## 2021-01-14 RX ORDER — HEPARIN SODIUM 10000 [USP'U]/100ML
100-1000 INJECTION, SOLUTION INTRAVENOUS CONTINUOUS PRN
Status: DISCONTINUED | OUTPATIENT
Start: 2021-01-14 | End: 2021-01-14 | Stop reason: HOSPADM

## 2021-01-14 RX ADMIN — SODIUM CHLORIDE: 9 INJECTION, SOLUTION INTRAVENOUS at 11:08

## 2021-01-14 ASSESSMENT — MIFFLIN-ST. JEOR: SCORE: 1452.53

## 2021-01-14 NOTE — Clinical Note
The first balloon was inserted into the left anterior descending and middle left anterior descending.Max pressure = 12 laquita. Total duration = 7 seconds.     Max pressure = 12 laquita. Total duration = 6 seconds.    Balloon reinflated a second time: Max pressure = 12 laquita. Total duration = 6 seconds.

## 2021-01-14 NOTE — H&P
History and Physical: Cardiology Cath Lab Service    Eric Victor MRN# [id number]   YOB: 1950 Age: 70 year old         Assessment and plan:   Eric Victor is a 70 year old male with a past medical history of ILD, multivessel CAD who presents today for coronary angiogram and staged PCI.     # CAD  - No contraindications noted, will move forward with coronary angiogram with staged intervention  - Patient will be admitted as OP to Obs unit post procedure, with plans to discharge home with wife/son after post procedure orders have been met    Patient seen and discussed with Dr. Casey GERMAIN, CNP  [facility] Cardiology Cath Lab Services  [phone]      HPI:   Eric Victor is a 70 year old male with a past medical history of ILD, multivessel CAD, who presents today for coronary angiogram and staged PCI of LAD.     Patient was originally seen as OP for right heart catheterization and coronary angiogram as part of his lung transplant evaluation. He was found to have multivessel disease. Patient is now s/p PCI RCA x 3 (12/10/2020). Transplant team reviewed case, as well as cardiology, pt deemed best fit with PCI. Patient reports due to his age, he is no longer a transplant candidate.     Patient reports feeling well today, denies recent illness, chest pain, shortness of breath, abdominal pain, headache, vision change, or weakness. No major changes in health history since previous visit. Patient reports baseline shortness of breath. He is on 2 liters supplemental oxygen with activity and at night. He denies any additional cardiac symptoms      Past Medical History:   Diagnosis Date     Coronary atherosclerosis of native coronary vessel 11/11/2020     History of blood transfusion 2009    [facility]     Uncomplicated asthma        Past Surgical History:   Procedure Laterality Date     BIOPSY  2018    colon polyp, [place]     CL AFF SURGICAL PATHOLOGY  2009    [facility]      COLONOSCOPY  2018     CV CORONARY ANGIOGRAM N/A 12/9/2020    Procedure: CV CORONARY ANGIOGRAM;  Surgeon: Rick Humphrey MD;  Location:  HEART CARDIAC CATH LAB     CV CORONARY ANGIOGRAM N/A 12/10/2020    Procedure: CV CORONARY ANGIOGRAM;  Surgeon: Sadi Peña MD;  Location:  HEART CARDIAC CATH LAB     CV PCI ANGIOPLASTY N/A 12/10/2020    Procedure: CV PCI ANGIOPLASTY;  Surgeon: Sadi Peña MD;  Location:  HEART CARDIAC CATH LAB     CV RIGHT HEART CATH N/A 12/9/2020    Procedure: CV RIGHT HEART CATH;  Surgeon: Rick Humphrey MD;  Location:  HEART CARDIAC CATH LAB     ORTHOPEDIC SURGERY  1990    hand       No current facility-administered medications on file prior to encounter.        aspirin (ASA) 81 MG EC tablet, Take 1 tablet (81 mg) by mouth daily       calcium carbonate 600 mg-vitamin D 400 units (CALTRATE) 600-400 MG-UNIT per tablet, Take 1 tablet by mouth daily       cholecalciferol 25 MCG (1000 UT) TABS, Take 5,000 Units by mouth daily       clopidogrel (PLAVIX) 75 MG tablet, Take 1 tablet (75 mg) by mouth daily       nintedanib (OFEV) 100 MG capsule, Take 100 mg by mouth daily       predniSONE (DELTASONE) 5 MG tablet, Take 1 tablet by mouth daily.       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   Substance Use Topics     Alcohol use: Yes     Frequency: 2-4 times a month     Drinks per session: 1 or 2     Binge frequency: Never     Comment: rare       No Known  "Allergies      ROS:   Skin: negative  Respiratory: No shortness of breath, dyspnea on exertion, cough, or hemoptysis- pt does report baseline intermittent SOB r/t ILD (stable on 2L O2)  Cardiovascular: negative for, palpitations, chest pain, exertional chest pain or pressure and paroxysmal nocturnal dyspnea  Gastrointestinal: negative  Genitourinary: negative  Musculoskeletal: negative  Neurologic: negative  Hematologic/Lymphatic/Immunologic: negative  Endocrine: negative    Physical Examination:  Vitals: BP (!) 146/102 (BP Location: Right arm)   Pulse 75   Temp 97.9  F (36.6  C) (Oral)   Resp 16   Ht 1.803 m (5' 11\")   Wt 68 kg (150 lb)   SpO2 100%   BMI 20.92 kg/m    BMI= Body mass index is 20.92 kg/m .    GENERAL APPEARANCE: healthy, alert and no distress  HEENT: no icterus, no xanthelasmas, normal pupil size and reaction, normal palate, mucosa moist  NECK: No JVD, brisk carotid upstroke bilaterally  CHEST: lungs clear to auscultation without rales, rhonchi or wheezes, no use of accessory muscles, no retractions  CARDIOVASCULAR: regular rhythm, normal S1 and S2, no S3 or S4 and no murmur, click or rub.  ABDOMEN: soft, non tender, without hepatosplenomegaly, no masses palpable, bowel sounds normal  EXTREMITIES: warm, no edema, DP/PT pulses 2+ bilaterally, no clubbing or cyanosis   NEURO: alert and oriented to person/place/time, normal speech, gait and affect  SKIN: no ecchymoses, no rashes      Laboratory:  CMP  Recent Labs   Lab 01/14/21  1020      POTASSIUM 4.4   CHLORIDE 101   CO2 32   ANIONGAP 4   GLC 86   BUN 29   CR 1.12   GFRESTIMATED 65   GFRESTBLACK 75   ADAL 9.6     CBC  Recent Labs   Lab 01/14/21  1020   WBC 12.5*   RBC 4.85   HGB 16.0   HCT 48.6      MCH 33.0   MCHC 32.9   RDW 12.7          No results found for: TROPI, TROPONIN, TROPR, TROPN      EKG 12/10/2020: NSR HR 70      TTE 12/7/2020:  Interpretation Summary  Global and regional left ventricular function is normal with " an EF of 55-60%.  Mild right ventricular dilation is present.  Global right ventricular function is normal.  Mildly positive bubble study suggestive of a very small PFO.  Right ventricular systolic pressure is 39mmHg above the right atrial pressure.  Ascending aorta 4.3 cm.  Sinuses of Valsalva 4.5 cm.  IVC diameter <2.1 cm collapsing >50% with sniff suggests a normal RA pressure  of 3 mmHg.  No pericardial effusion is present.  Mild aortic root dilation and moderate dilation of ascending aorta when  infexed to BSA of 1.9m2.    Coronary angiogram 12/10/2021:  Pre Procedure Diagnosis    RCA Dissection    Post Procedure Diagnosis    RCA Dissection with Successful PCI   POBA of ostial RV Marginal Branch      Conclusion  1.  Successful percutaneous intervention of an ostial-proximal RCA dissection that propagated to the mid RCA.  This lesion was covered with 3 overlapping LAM.  2.  Successful percutaneous intervention/POBA of the ostium of the RV marginal branch.  3.  Successful closure of femoral arteriotomy using Angioseal.  4.  No apparent complications.  Patient tolerated procedure well.  Family updated at conclusion of case.         Coronary Findings  DiagnosticDominance: Right  Right Coronary Artery   Ost RCA to Prox RCA lesion is 50% stenosed.   Right Ventricular Branch   RV Branch lesion is 80% stenosed.   Right Posterior Descending Artery   RPDA lesion is 35% stenosed.     Intervention  Ost RCA to Prox RCA lesion   Stent   Lesion length: 40 mm. The pre-interventional distal flow is normal (RUBÉN 3). A stent was successfully placed. The post-interventional distal flow is normal (RUBÉN 3). The intervention was successful. No complications occurred at this lesion. A 6 Fr JR 4 guide was used to engage the ostium of the RCA. A runthrough wire was advanced and positioned in the distal rPL branch. Due to difficulty delivering equipment an All Star wire was used as a refugio wire also positioned in the rPL. The lesion was  pre-dilated with a 3.5 * 12 Emerge and a 4.0 * 16 Synergy LAM was placed in the proximal/ostial RCA covering the proximal edge of the dissection. This was post-dilated with a 4.5 * 8 mm NC Emerge balloon. IVUS was used to estimate vessel diameter at 5 mm. Post dilated again with a 5.0 * 12 mm NC Emerge balloon. A 4.0 * 12 Synergy LAM was then placed at the mid RCA due to evidence of a distal dissection. This was post-dilated with 5.0 * 12 NC Emerge balloon. At this point it was noted that the ostium of the RV marginal was pinched so we used a 2.0 * 12 mm Emerge balloon to POBA the ostium of the RV marginal. A 4.0 * 12 Synergy LAM was then placed across the mid vessel, overlapping the proximal and distal stents and was then post-dilated with a 4.5 * 20 NC Emerge. Final angiography demonstrated no residual dissection, RUBÉN 3 flow down the vessel and no complications from the wires.   There is a 0% residual stenosis post intervention.     RV Branch lesion   Angioplasty   Angioplasty was performed following stent deployment. CATH BALLOON EMERGE 2.0X12MM [id number] guide catheter was successful. The guidewire guidewire crosses lesion The pre-interventional distal flow is decreased (RUBÉN 2). The post-interventional distal flow is normal (RUBÉN 3). Intervention successful.   There is a 30% residual stenosis post intervention.       I have seen and examined the patient and agree with the finding and plan.       Sadi Peña MD  Cardiology-[facility]  [phone]

## 2021-01-14 NOTE — PROGRESS NOTES
D/I/A: Pt roomed on 3C in bay 24.  Arrived via litter and accompanied by CCL RN, on  monitor.  VSSA.  Rhythm upon arrival SB on monitor.  Denies pain or sob.  Reviewed activity restrictions and when to notify RN, ie-changes to breathing or increased chest pressure or chest pain.  CCL access:  LF with angioseal.   P: Continue to monitor status.  Discharge to home once meeting criteria.

## 2021-01-14 NOTE — PRE-PROCEDURE
GENERAL PRE-PROCEDURE:   Procedure:  Coronary angiogram with planned PCI  Date/Time:  1/14/2021 11:11 AM    Verbal consent obtained?: Yes    Written consent obtained?: Yes    Risks and benefits: Risks, benefits and alternatives were discussed    DC Plan: Appropriate discharge home plan in place for patients who are going home after procedure   Consent given by:  Patient  Patient states understanding of procedure being performed: Yes    Patient's understanding of procedure matches consent: Yes    Procedure consent matches procedure scheduled: Yes    Expected level of sedation:  Moderate  Appropriately NPO:  Yes  ASA Class:  Class 3- Severe systemic disease, definite functional limitations  Mallampati  :  Grade 3- soft palate visible, posterior pharyngeal wall not visible  Lungs:  Lungs clear with good breath sounds bilaterally  Heart:  Normal heart sounds and rate  History & Physical reviewed:  History and physical reviewed and no updates needed  Statement of review:  I have reviewed the lab findings, diagnostic data, medications, and the plan for sedation    JESSA Banerjee, CNP  [facility]

## 2021-01-14 NOTE — PROGRESS NOTES
Pt arrives to 2a for CORS, planned PCI. H&P needs to be updated. Consent needs to be signed. Pt aware of approximate 2 hour delay. Pt reports he ate breakfast at 0800, CCL aware.   Carmen, NP aware WBC 12.5, hx ILD, pt is on prednisone.

## 2021-01-14 NOTE — DISCHARGE INSTRUCTIONS
"  Going home after a Coronary angiogram with Stent Placement    PROCEDURE SITE:  It is normal to have soreness, mild bruising or a small lump at the puncture site. You may shower but please do not use a hot tub, bath tub or pool for 2 days. Do not apply any lotion or powder near the site for 2 days. For 2 days when you cough, sneeze or push with a bowel movement place your hand over the puncture site and apply gentle pressure. For the first 2 days avoid squatting. Avoid lifting more than 10 pounds for at least 5 days. Further activity restrictions as below. If you feel a \"pop\" with pain and/or notice significant increase in pain, swelling or bleeding from the site- immediately lie down, press firmly on the site and proceed to the nearest medical facility.      MEDICATIONS:  1. You have begun Plavix (clopidogrel). This medication is essential for your stent(s). Do not stop it without speaking first to your heart doctor or their nurse- this includes if you have concerns about side effects or cost. Also, if another health provider tells you to stop it, let them know they need to discuss it with your heart doctor.   2. If you are on Metformin (Glucophage) or any medications that contain this, you should not take it for at least 48 hours (2 days) from the time of your procedure. If you have baseline kidney problems, you may be instructed to also have a lab test drawn in 2-3 days before getting the okay to restart it.  3. If you have not been continued on other medications you were previously taking at home it is probably for reason though please discuss your concerns with any of your doctors.     DIET:  We recommend a diet low in saturated fat, trans fat and cholesterol. In addition it will be helpful to be cautious of sodium intake and carbohydrates. Try to increase the amount of lean meats you eat like fish and chicken, but avoid frying; and reduce the amount of red meat you eat. Eat more fresh fruits and vegetables " and try to avoid canned and processed food. Please reference the handouts you received for more specific information.    CARDIAC REHAB:  After the initial healing process of the procedure site, we recommend cardiac rehabilitation for all heart attack and stent patients. Cardiac rehabilitation will help you:  - Rebuild stamina, strength and balance.  - Learn how to participate in activities safely, as well as help you regain confidence to do so.  - Return to activities of daily living and leisure.  You should receive a call from them within the next week, but if you do not or you would like to call you can contact their central scheduling at [phone]    OTHER INFORMATION:  1. Consider having your family members learn CPR if they do not know it already.  2. It's not uncommon for heart attack sufferers to experience feelings of depression, anxiety or denial. Please do not be afraid to discuss these symptoms with any of your health providers at any point in your recovery.  3. If you are a smoker, quitting smoking will be one of the most important things you can do for yourself. There are nicotine replacement options or medications they might be able to be prescribed. Please discuss this with your doctors. Consider calling the [employer]uit[employer] at [phone] ([phone]) as they can offer ongoing support after discharge.    CALL YOUR DOCTOR IF:  -You have a large or growing lump/bump around the procedure site  -The site is red, swollen, hot, tender or has drainage  -You have hives, a rash or unusual itching  -You have increasing or worsening shortness of breath or chest pain    FOLLOW UP:  We prefer you to follow up with your primary care provider within one week. You will be arranged to see the cardiologist (heart doctor) in clinic in about one month.     Should you need to contact us:  Cardiology clinic for scheduling or triage nurse questions/concerns:  [phone]

## 2021-01-14 NOTE — Clinical Note
Stent deployed in the middle left anterior descending. Max pressure = 12 laquita. Total duration = 9 seconds.

## 2021-01-15 ENCOUNTER — HEALTH MAINTENANCE LETTER (OUTPATIENT)
Age: 71
End: 2021-01-15

## 2021-01-15 NOTE — PLAN OF CARE
D/1930, He has drank fluids, and declined food as his wife has dinner waiting for him He walked and voided. Groins sites unchanged,     Meds  Form  Tricia BENÍTEZ brought to front door to ride to take him home

## 2021-01-17 LAB
INTERPRETATION ECG - MUSE: NORMAL
INTERPRETATION ECG - MUSE: NORMAL

## 2021-01-26 NOTE — TELEPHONE ENCOUNTER
REFERRAL INFORMATION:    Referring Provider:  N/A    Referring Clinic:  N/A    Reason for Visit/Diagnosis: Follow up manometry     FUTURE VISIT INFORMATION:    Appointment Date: 2021    Appointment Time: 12:40 PM      NOTES STATUS DETAILS   OFFICE NOTE from Referring Provider N/A    OFFICE NOTE from Other Specialist N/A    HOSPITAL DISCHARGE SUMMARY/  ED VISITS N/A    OPERATIVE REPORT Internal Esophageal Reflex: 2020  Esophageal PH Monitorin2020   MEDICATION LIST Internal         ENDOSCOPY  N/A    COLONOSCOPY Care Everywhere 10/16/18 ([facility])    ERCP N/A    EUS N/A    STOOL TESTING N/A    PERTINENT LABS Care Everywhere/ Internal     PATHOLOGY REPORTS (RELATED) Care Everywhere 10/16/18   IMAGING (CT, MRI, EGD, MRCP, Small Bowel Follow Through/SBT, MR/CT Enterography) N/A

## 2021-02-03 LAB
MYCOBACTERIUM SPEC CULT: NORMAL
MYCOBACTERIUM SPEC CULT: NORMAL
SPECIMEN SOURCE: NORMAL

## 2021-02-08 ENCOUNTER — VIRTUAL VISIT (OUTPATIENT)
Dept: CARDIOLOGY | Facility: CLINIC | Age: 71
End: 2021-02-08
Attending: PHYSICIAN ASSISTANT
Payer: MEDICARE

## 2021-02-08 DIAGNOSIS — I25.110 CORONARY ARTERY DISEASE INVOLVING NATIVE HEART WITH UNSTABLE ANGINA PECTORIS, UNSPECIFIED VESSEL OR LESION TYPE (H): Primary | ICD-10-CM

## 2021-02-08 PROBLEM — I51.89 OTHER ILL-DEFINED HEART DISEASES: Status: RESOLVED | Noted: 2020-12-17 | Resolved: 2021-02-08

## 2021-02-08 PROBLEM — I25.10 CORONARY ATHEROSCLEROSIS OF NATIVE CORONARY VESSEL: Status: RESOLVED | Noted: 2020-11-11 | Resolved: 2021-02-08

## 2021-02-08 PROBLEM — R93.89 ABNORMAL CT OF THE CHEST: Status: RESOLVED | Noted: 2020-11-11 | Resolved: 2021-02-08

## 2021-02-08 PROBLEM — Z98.890 STATUS POST CORONARY ANGIOGRAM: Status: RESOLVED | Noted: 2020-12-09 | Resolved: 2021-02-08

## 2021-02-08 PROCEDURE — 99215 OFFICE O/P EST HI 40 MIN: CPT | Mod: 95 | Performed by: PHYSICIAN ASSISTANT

## 2021-02-08 NOTE — PATIENT INSTRUCTIONS
You were seen today in the Cardiovascular Clinic at the [facility].  Today, you were seen by Rose Mary Carrasco PA-C for your cardiovascular care.     Changes Made Today:  No changes made to medications    Follow Up Appointment/Tests:  1. Lipid panel in 3 months; please discuss with PCP in regards to ordering this test  2. Interventional cardiology MD (Dr. Cantor) in /July this year to discuss discontinuation of plavix.      Questions and [phone].   First press #1 for the University and then press #3 for Medical Questions to reach the Cardiology triage nurse.     On Call Cardiologist for after hours or on weekends: [phone], press option #4 and ask to speak to the on-call Cardiologist.

## 2021-02-08 NOTE — PROGRESS NOTES
[facility] Cardiology - [facility] Video Visit  Cardiovascular Clinic Note      Referring Provider: Michelle Kent   Primary Care Provider: Michelle Kent     Patient Name: Eric Victor   MRN: [id number]       History of Present Illness:  Eric Victor is a 70 year old male with PMH notable for CAD s/p PCI, ILD, mild dilation of ascending aorta, hx of and atrial fibrillation who presents to clinic today with questions regarding the use of Plavix/aspirin.    The patient presented in the outpatient setting (as a part of work up for possible lung transplantation) for coronary angiogram 12/9.  Coronary angiogram showed significant stenosis of LAD, RCA, D1 and OM1.  The case was complicated by RCA dissection.  The patient was transferred to floor without intervention for surgical evaluation and monitoring given dissection. In conjunction with lung transplantation team, the team elected to move forward with PCI.  He returned to the cath lab 12/10 for LAM x3 to RCA (treating both disease and dissection). The patient was discharged to home. The patient again returned to the cath lab 1/14; at that point in time, he underwent additional stenting to pLAD and OM1. LM coronary artery was evaluated (given that upon visual inspection, appeared to be 40% stenosed); FFR was negative at 0.82. He was discharged the same day.    Since discharge, the patient notes he has been doing well.  He is experienced no chest pain with exertion or at rest.  The patient's baseline shortness of breath is unchanged; he can walk up approximately 1 flight of stairs before needing a break.  He denies lower extremity swelling, palpitations, and dizziness/lightheadedness.  He endorses paroxysmal nocturnal dyspnea, but notes that this preceded his stent placements and is attributed to his interstitial lung disease.  Radial access was utilized for his coronary angiogram in December 2020; site is healed without bruising/hematoma.  Groin access was  used for his second angiogram in December 2020 and again for his angiogram in January 2021; he noted overlying ecchymoses to the access site, but denies hematoma-like symptoms at the site.    Pertinent Cardiac Medications:  75mg Plavix daily  40mg Lipitor daily  81mg ASA daily    Past Medical History:  Pertinent past medical history as above.      Past Surgical History:  Past Surgical History:   Procedure Laterality Date     BIOPSY  2018    colon polyp, [place]     CL AFF SURGICAL PATHOLOGY  2009    [facility]     COLONOSCOPY  2018     CV CORONARY ANGIOGRAM N/A 12/9/2020    Procedure: CV CORONARY ANGIOGRAM;  Surgeon: Rick Humphrey MD;  [facility]     CV CORONARY ANGIOGRAM N/A 12/10/2020    Procedure: CV CORONARY ANGIOGRAM;  Surgeon: Sadi Peña MD;  [facility]     CV CORONARY ANGIOGRAM N/A 1/14/2021    Procedure: CV CORONARY ANGIOGRAM;  Surgeon: Sadi Peña MD;  [facility]     CV FRACTIONAL FLOW RESERVE N/A 1/14/2021    Procedure: Fractional Flow Reserve;  Surgeon: Sadi Peña MD;  [facility]     CV PCI ANGIOPLASTY N/A 12/10/2020    Procedure: CV PCI ANGIOPLASTY;  Surgeon: Sadi Peña MD;  [facility]     CV PCI ANGIOPLASTY N/A 1/14/2021    Procedure: CV PCI ANGIOPLASTY;  Surgeon: Sadi Peña MD;  [facility]     CV RIGHT HEART CATH MEASUREMENTS RECORDED N/A 12/9/2020    Procedure: CV RIGHT HEART CATH;  Surgeon: Rick Humphrey MD;  [facility]     ORTHOPEDIC SURGERY  1990    hand         Family History:  Family History   Problem Relation Age of Onset     Lupus Mother      Alzheimer Disease Mother         passed away in her 80's      Raynaud syndrome Mother      Liver Cancer Father         passed away from liver cancer in his mid 80's     Bone Cancer Father      Prostate Cancer Father           Current Medications:  Current Outpatient Medications   Medication Sig Dispense Refill     aspirin (ASA) 81 MG EC tablet Take 1 tablet (81 mg) by mouth daily 90 tablet 3     atorvastatin (LIPITOR) 40 MG tablet Take 1 tablet (40 mg) by mouth daily 90 tablet 3     calcium carbonate 600 mg-vitamin D 400 units (CALTRATE) 600-400 MG-UNIT per tablet Take 1 tablet by mouth daily       cholecalciferol 25 MCG (1000 UT) TABS Take 5,000 Units by mouth daily       clopidogrel (PLAVIX) 75 MG tablet Take 1 tablet (75 mg) by mouth daily 90 tablet 3     nintedanib (OFEV) 100 MG capsule Take 100 mg by mouth daily       predniSONE (DELTASONE) 5 MG tablet Take 1 tablet by mouth daily.       clopidogrel (PLAVIX) 75 MG tablet Take 1 tablet (75 mg) by mouth daily 90 tablet 3     clopidogrel (PLAVIX) 75 MG tablet Take 1 tablet (75 mg) by mouth daily (Patient not taking: Reported on 2/8/2021) 90 tablet 3       Social History:  Lives at home with his wife.  Reports that he is no longer a lung transplant candidate.    Review of Systems:  A comprehensive review of systems was performed and negative unless otherwise noted in the HPI above.    Physical Exam:  Unable to assess vitals given video visit.    Wt Readings from Last 2 Encounters:   01/14/21 68 kg (150 lb)   12/10/20 68.5 kg (151 lb)     Complete physical exam could not be preformed due to nature of video visit.  Constitutional: no acute distress, pleasant and cooperative, appears overall well.  Eyes: PERRLA  Cardiovascular: Extremities without cyanosis  Respiratory: Breathing nonlabored  Gastrointestinal: soft, nontender, non distended  Musculoskeletal: normal muscle bulk and tone, joints   Skin: normal skin appearance without worrisome lesions.   Neurologic: AOx3  Psychiatric: appropriate affect, eye contact, intact thought and speech      Laboratory Data:  LIPID RESULTS:  Recent Labs   Lab Test 01/14/21  1710 12/07/20  1016   CHOL 208* 235*   HDL 56 74   * 125*   TRIG  53 179*        LIVER ENZYME RESULTS:  Recent Labs   Lab Test 12/07/20  1016   AST 14   ALT 19       CBC RESULTS:  Recent Labs   Lab Test 01/14/21  1020 12/09/20  1119   WBC 12.5* 12.2*   HGB 16.0 15.5   HCT 48.6 48.3    212       BMP RESULTS:  Recent Labs   Lab Test 01/14/21  1020 12/09/20  1028    138   POTASSIUM 4.4 4.1   CHLORIDE 101 104   CO2 32 30   ANIONGAP 4 3   GLC 86 96   BUN 29 22   CR 1.12 1.00   ADAL 9.6 9.0     INR RESULTS:  Recent Labs   Lab Test 12/07/20  1016   INR 0.98         Pertinent Procedures/Imaging:  Coronary Angiogram 12/9:    Mild elevated pulmonary hypertension.    Right sided filling pressures are normal.    Left sided filling pressures are normal.    Normal cardiac output level.    Coronary artery disease    >>> Moderate distal LMCA stenosis (40%)    >>> Proximal RCA dissection with RUBÉN-III flow and 70% proximal RCA stenosis    >>> Proximal OM1 90% stenosis    >>> Mid LAD 80% stenosis with large D1 emerging just distal to the lesion    RPDA lesion is 25% stenosed.    Coronary Angiogram 12/10:  1.  Successful percutaneous intervention of an ostial-proximal RCA dissection that propagated to the mid RCA.  This lesion was covered with 3 overlapping LAM.  2.  Successful percutaneous intervention/POBA of the ostium of the RV marginal branch.  3.  Successful closure of femoral arteriotomy using Angioseal.  4.  No apparent complications.  Patient tolerated procedure well.  Family updated at conclusion of case.    Coronary Angiogram 1/14:  3 Vessel coronary artery disease  - FFR negative (0.82) moderate left main coronary artery disease  - LAM x1 to proximal/mid LAD  - LAM x1 to OM1  - Previous LAM x3 to RCA on 12/10/20 - not evaluated on present study    Continuation of dual antiplatelet therapy for 12 months   Post antiplatelet therapy of    Aspirin; give 81 mg qd .     Plavix; give 300 mg now and 75 mg qd daily.      Continue high dose statin therapy      Assessment:  Erci NATH  Valente is a 70 year old male with PMH notable for CAD s/p PCI, ILD, mild dilation of ascending aorta, hx of and atrial fibrillation who presents to clinic today with questions regarding the use of Plavix/aspirin.    70-year-old man with multiple comorbidities who presents today for angiogram follow-up.  He has no new anginal equivalent symptoms; he continues to suffer from baseline shortness of breath due to his interstitial lung disease.  He is planned to have a colonoscopy sometime in the next month; notes that his gastroenterologist refuses to perform any interventions while on dual antiplatelet therapy.  Discussed with patient that due to the placement of his stent in his proximal LAD, that I am uncomfortable signing off on discontinuation of Plavix at this point in time.  Patient expresses understanding      Plan:  # CAD s/p recent PCI to RCA, LAD, OM1  -Follow-up with interventional cardiology MD in June/July of this year.  Patient will have had his stents in place for approximately 6 months.  They will discuss possible discontinuation of Plavix at that point in time given that he will have completed approximately 6 months of dual antiplatelet therapy  -For the time being, continue goal-directed medical therapy   - DAPT: 81mg ASA, 75mg Plavix daily   - Statin: 40mg Lipitor daily   - BB: not indicated, not ACS event   - ACE: not indicated, EF > 45%    # HLD  Statin initiated January 2020. Patient prefers to follow up with PCP for ongoing lipid management.  - recommend fasting lipid panel in 3 months; PCP to order  - goal LDL < 70      Follow-up: 5 months with interventional MD.       A total of 34 minutes spent face to face with patient answering questions.  An additional 15 minutes was spent preforming chart review/documentation.     Rose Mary Carrasco PA-C  Interventional/Critical Care Cardiology  [phone]        CC  Patient Care Team:  Michelle Kent MD as PCP - General  Zuleima Levin MD as MD  (Pulmonary Disease)  Dorita Souza MD as Assigned Pulmonology Provider  [facility], Margaret Castro MD as Assigned Palliative Care Provider  ADRIANNE, DB THAKUR

## 2021-02-08 NOTE — PROGRESS NOTES
"Eric is a 70 year old who is being evaluated via a billable video visit.      How would you like to obtain your AVS? MyChart  If the video visit is dropped, the invitation should be resent by: Text to cell phone: [phone]  Will anyone else be joining your video visit? No      Video Start Time: 08:58     Vitals - Patient Reported  Weight (Patient Reported): 68 kg (150 lb)  Height (Patient Reported): 180.3 cm (5' 11\")  BMI (Based on Pt Reported Ht/Wt): 20.92  Pain Score: No Pain (0)(Pt always SOB)      Video-Visit Details    Type of service:  Video Visit    Video End Time:09:32    Originating Location (pt. Location): Home    Distant Location (provider location):  [facility] HEART [facility]     Platform used for Video Visit: Becky  "

## 2021-02-08 NOTE — LETTER
"2/8/2021      RE: Eric Victor  [address]  [facility] [address]       Dear Colleague,    Thank you for the opportunity to participate in the care of your patient, Eric Victor, at the [facility] HEART [facility]. Please see a copy of my visit note below.    Eric is a 70 year old who is being evaluated via a billable video visit.      How would you like to obtain your AVS? MyChart  If the video visit is dropped, the invitation should be resent by: Text to cell phone: [phone]  Will anyone else be joining your video visit? No      Video Start Time: 08:58     Vitals - Patient Reported  Weight (Patient Reported): 68 kg (150 lb)  Height (Patient Reported): 180.3 cm (5' 11\")  BMI (Based on Pt Reported Ht/Wt): 20.92  Pain Score: No Pain (0)(Pt always SOB)      Video-Visit Details    Type of service:  Video Visit    Video End Time:09:32    Originating Location (pt. Location): Home    Distant Location (provider location):  [facility]     Platform used for Video Visit: [employer] [facility] - [facility] Video Visit  Cardiovascular Clinic Note      Referring Provider: Michelle Kent   Primary Care Provider: Michelle Kent     Patient Name: Eric Victor   MRN: [id number]       History of Present Illness:  Eric Victor is a 70 year old male with PMH notable for CAD s/p PCI, ILD, mild dilation of ascending aorta, hx of and atrial fibrillation who presents to clinic today with questions regarding the use of Plavix/aspirin.    The patient presented in the outpatient setting (as a part of work up for possible lung transplantation) for coronary angiogram 12/9.  Coronary angiogram showed significant stenosis of LAD, RCA, D1 and OM1.  The case was complicated by RCA dissection.  The patient was transferred to floor without intervention for surgical evaluation and monitoring given dissection. In " conjunction with lung transplantation team, the team elected to move forward with PCI.  He returned to the cath lab 12/10 for LAM x3 to RCA (treating both disease and dissection). The patient was discharged to home. The patient again returned to the cath lab 1/14; at that point in time, he underwent additional stenting to pLAD and OM1. LM coronary artery was evaluated (given that upon visual inspection, appeared to be 40% stenosed); FFR was negative at 0.82. He was discharged the same day.    Since discharge, the patient notes he has been doing well.  He is experienced no chest pain with exertion or at rest.  The patient's baseline shortness of breath is unchanged; he can walk up approximately 1 flight of stairs before needing a break.  He denies lower extremity swelling, palpitations, and dizziness/lightheadedness.  He endorses paroxysmal nocturnal dyspnea, but notes that this preceded his stent placements and is attributed to his interstitial lung disease.  Radial access was utilized for his coronary angiogram in December 2020; site is healed without bruising/hematoma.  Groin access was used for his second angiogram in December 2020 and again for his angiogram in January 2021; he noted overlying ecchymoses to the access site, but denies hematoma-like symptoms at the site.    Pertinent Cardiac Medications:  75mg Plavix daily  40mg Lipitor daily  81mg ASA daily    Past Medical History:  Pertinent past medical history as above.      Past Surgical History:  Past Surgical History:   Procedure Laterality Date     BIOPSY  2018    colon polyp, [place]     CL AFF SURGICAL PATHOLOGY  2009    [facility]     COLONOSCOPY  2018     CV CORONARY ANGIOGRAM N/A 12/9/2020    Procedure: CV CORONARY ANGIOGRAM;  Surgeon: Rick Humphrey MD;  Location: [facility] CARDIAC CATH LAB     CV CORONARY ANGIOGRAM N/A 12/10/2020    Procedure: CV CORONARY ANGIOGRAM;  Surgeon: Sadi Peña MD;  Location: [facility] CARDIAC CATH LAB      CV CORONARY ANGIOGRAM N/A 1/14/2021    Procedure: CV CORONARY ANGIOGRAM;  Surgeon: Sadi Peña MD;  [facility]     CV FRACTIONAL FLOW RESERVE N/A 1/14/2021    Procedure: Fractional Flow Reserve;  Surgeon: Sadi Peña MD;  [facility]     CV PCI ANGIOPLASTY N/A 12/10/2020    Procedure: CV PCI ANGIOPLASTY;  Surgeon: Sadi Peña MD;  [facility]     CV PCI ANGIOPLASTY N/A 1/14/2021    Procedure: CV PCI ANGIOPLASTY;  Surgeon: Sadi Peña MD;  [facility]     CV RIGHT HEART CATH MEASUREMENTS RECORDED N/A 12/9/2020    Procedure: CV RIGHT HEART CATH;  Surgeon: Rick Humphrey MD;  [facility]     ORTHOPEDIC SURGERY  1990    hand         Family History:  Family History   Problem Relation Age of Onset     Lupus Mother      Alzheimer Disease Mother         passed away in her 80's      Raynaud syndrome Mother      Liver Cancer Father         passed away from liver cancer in his mid 80's     Bone Cancer Father      Prostate Cancer Father          Current Medications:  Current Outpatient Medications   Medication Sig Dispense Refill     aspirin (ASA) 81 MG EC tablet Take 1 tablet (81 mg) by mouth daily 90 tablet 3     atorvastatin (LIPITOR) 40 MG tablet Take 1 tablet (40 mg) by mouth daily 90 tablet 3     calcium carbonate 600 mg-vitamin D 400 units (CALTRATE) 600-400 MG-UNIT per tablet Take 1 tablet by mouth daily       cholecalciferol 25 MCG (1000 UT) TABS Take 5,000 Units by mouth daily       clopidogrel (PLAVIX) 75 MG tablet Take 1 tablet (75 mg) by mouth daily 90 tablet 3     nintedanib (OFEV) 100 MG capsule Take 100 mg by mouth daily       predniSONE (DELTASONE) 5 MG tablet Take 1 tablet by mouth daily.       clopidogrel (PLAVIX) 75 MG tablet Take 1 tablet (75 mg) by mouth daily 90 tablet 3     clopidogrel (PLAVIX) 75 MG tablet Take 1 tablet (75 mg) by mouth daily  (Patient not taking: Reported on 2/8/2021) 90 tablet 3       Social History:  Lives at home with his wife.  Reports that he is no longer a lung transplant candidate.    Review of Systems:  A comprehensive review of systems was performed and negative unless otherwise noted in the HPI above.    Physical Exam:  Unable to assess vitals given video visit.    Wt Readings from Last 2 Encounters:   01/14/21 68 kg (150 lb)   12/10/20 68.5 kg (151 lb)     Complete physical exam could not be preformed due to nature of video visit.  Constitutional: no acute distress, pleasant and cooperative, appears overall well.  Eyes: PERRLA  Cardiovascular: Extremities without cyanosis  Respiratory: Breathing nonlabored  Gastrointestinal: soft, nontender, non distended  Musculoskeletal: normal muscle bulk and tone, joints   Skin: normal skin appearance without worrisome lesions.   Neurologic: AOx3  Psychiatric: appropriate affect, eye contact, intact thought and speech      Laboratory Data:  LIPID RESULTS:  Recent Labs   Lab Test 01/14/21  1710 12/07/20  1016   CHOL 208* 235*   HDL 56 74   * 125*   TRIG 53 179*        LIVER ENZYME RESULTS:  Recent Labs   Lab Test 12/07/20  1016   AST 14   ALT 19       CBC RESULTS:  Recent Labs   Lab Test 01/14/21  1020 12/09/20  1119   WBC 12.5* 12.2*   HGB 16.0 15.5   HCT 48.6 48.3    212       BMP RESULTS:  Recent Labs   Lab Test 01/14/21  1020 12/09/20  1028    138   POTASSIUM 4.4 4.1   CHLORIDE 101 104   CO2 32 30   ANIONGAP 4 3   GLC 86 96   BUN 29 22   CR 1.12 1.00   ADAL 9.6 9.0     INR RESULTS:  Recent Labs   Lab Test 12/07/20  1016   INR 0.98         Pertinent Procedures/Imaging:  Coronary Angiogram 12/9:    Mild elevated pulmonary hypertension.    Right sided filling pressures are normal.    Left sided filling pressures are normal.    Normal cardiac output level.    Coronary artery disease    >>> Moderate distal LMCA stenosis (40%)    >>> Proximal RCA dissection with RUBÉN-III  flow and 70% proximal RCA stenosis    >>> Proximal OM1 90% stenosis    >>> Mid LAD 80% stenosis with large D1 emerging just distal to the lesion    RPDA lesion is 25% stenosed.    Coronary Angiogram 12/10:  1.  Successful percutaneous intervention of an ostial-proximal RCA dissection that propagated to the mid RCA.  This lesion was covered with 3 overlapping LAM.  2.  Successful percutaneous intervention/POBA of the ostium of the RV marginal branch.  3.  Successful closure of femoral arteriotomy using Angioseal.  4.  No apparent complications.  Patient tolerated procedure well.  Family updated at conclusion of case.    Coronary Angiogram 1/14:  3 Vessel coronary artery disease  - FFR negative (0.82) moderate left main coronary artery disease  - LAM x1 to proximal/mid LAD  - LAM x1 to OM1  - Previous LAM x3 to RCA on 12/10/20 - not evaluated on present study    Continuation of dual antiplatelet therapy for 12 months   Post antiplatelet therapy of    Aspirin; give 81 mg qd .     Plavix; give 300 mg now and 75 mg qd daily.      Continue high dose statin therapy      Assessment:  Eric Victor is a 70 year old male with PMH notable for CAD s/p PCI, ILD, mild dilation of ascending aorta, hx of and atrial fibrillation who presents to clinic today with questions regarding the use of Plavix/aspirin.    70-year-old man with multiple comorbidities who presents today for angiogram follow-up.  He has no new anginal equivalent symptoms; he continues to suffer from baseline shortness of breath due to his interstitial lung disease.  He is planned to have a colonoscopy sometime in the next month; notes that his gastroenterologist refuses to perform any interventions while on dual antiplatelet therapy.  Discussed with patient that due to the placement of his stent in his proximal LAD, that I am uncomfortable signing off on discontinuation of Plavix at this point in time.  Patient expresses understanding      Plan:  # CAD s/p  recent PCI to RCA, LAD, OM1  -Follow-up with interventional cardiology MD in June/July of this year.  Patient will have had his stents in place for approximately 6 months.  They will discuss possible discontinuation of Plavix at that point in time given that he will have completed approximately 6 months of dual antiplatelet therapy  -For the time being, continue goal-directed medical therapy   - DAPT: 81mg ASA, 75mg Plavix daily   - Statin: 40mg Lipitor daily   - BB: not indicated, not ACS event   - ACE: not indicated, EF > 45%    # HLD  Statin initiated January 2020. Patient prefers to follow up with PCP for ongoing lipid management.  - recommend fasting lipid panel in 3 months; PCP to order  - goal LDL < 70      Follow-up: 5 months with interventional MD.       A total of 34 minutes spent face to face with patient answering questions.  An additional 15 minutes was spent preforming chart review/documentation.     Rose Mary Carrasco PA-C  Interventional/Critical Care Cardiology  [phone]    CC  Patient Care Team:  Michelle Kent MD as PCP - Zuleima Berg MD as MD (Pulmonary Disease)  Dorita Souza MD as Assigned Pulmonology Provider  Margaret Travis MD as Assigned Palliative Care Provider  MICHELLE KENT

## 2021-02-10 ENCOUNTER — TELEPHONE (OUTPATIENT)
Facility: CLINIC | Age: 71
End: 2021-02-10

## 2021-02-10 NOTE — TELEPHONE ENCOUNTER
2/10 [facility] for pt to schedule fasting labs in 3 months, and f/u with Dr. Cantor in July or June -LR

## 2021-02-16 ENCOUNTER — VIRTUAL VISIT (OUTPATIENT)
Dept: GASTROENTEROLOGY | Facility: CLINIC | Age: 71
End: 2021-02-16
Payer: MEDICARE

## 2021-02-16 ENCOUNTER — PRE VISIT (OUTPATIENT)
Dept: GASTROENTEROLOGY | Facility: CLINIC | Age: 71
End: 2021-02-16

## 2021-02-16 VITALS — BODY MASS INDEX: 21 KG/M2 | HEIGHT: 71 IN | WEIGHT: 150 LBS

## 2021-02-16 DIAGNOSIS — J84.9 ILD (INTERSTITIAL LUNG DISEASE) (H): ICD-10-CM

## 2021-02-16 DIAGNOSIS — K22.4 JACKHAMMER ESOPHAGUS: Primary | ICD-10-CM

## 2021-02-16 DIAGNOSIS — I25.10 CORONARY ARTERY DISEASE INVOLVING NATIVE CORONARY ARTERY OF NATIVE HEART, ANGINA PRESENCE UNSPECIFIED: ICD-10-CM

## 2021-02-16 DIAGNOSIS — D12.6 TUBULAR ADENOMA OF COLON: ICD-10-CM

## 2021-02-16 DIAGNOSIS — D12.6 TUBULOVILLOUS ADENOMA OF COLON: ICD-10-CM

## 2021-02-16 DIAGNOSIS — Z79.02 LONG TERM CURRENT USE OF CLOPIDOGREL: ICD-10-CM

## 2021-02-16 PROCEDURE — 99204 OFFICE O/P NEW MOD 45 MIN: CPT | Mod: 95 | Performed by: INTERNAL MEDICINE

## 2021-02-16 ASSESSMENT — PAIN SCALES - GENERAL: PAINLEVEL: NO PAIN (0)

## 2021-02-16 ASSESSMENT — MIFFLIN-ST. JEOR: SCORE: 1452.53

## 2021-02-16 NOTE — PROGRESS NOTES
Referring provider: Dr. Jamil    CC: 70 year old male referred by Dr. Jamil for evaluation of abnormal esophageal manometry obtained during lung transplant work-up.    HPI:   Mr. Victor is a 70-year-old male with history of interstitial lung disease, coronary artery disease status post recent PCI on dual antiplatelet therapy, who was incidentally noted to have abnormal esophageal manometry during work-up for lung transplant for his ILD.    He denies any dysphagia, odynophagia, chest pain, heartburn, reflux, regurgitation, food impaction, nausea, vomiting, dyspepsia, or other symptoms of esophageal diseases.    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    PH impedence testing notable for mildly elevated weakly acidic events, with normal DeMeester score at 9.1, and normal acid exposure time at 2.9%.    He has had a CT chest numerous occasions for his ILD, which showed no evidence of esophageal wall abnormalities.  He does have a rather tortuous esophagus as well as tortuous aorta.  He has not undergone an upper endoscopy or esophagram.  Colonoscopy in 2018 notable for 7 polyps, largest was 13 mm, 5 of which were tubular adenomas, one tubulovillous adenoma.    A 10 point ROS is otherwise negative.    Past Medical History:   Diagnosis Date     Coronary atherosclerosis of native coronary vessel 11/11/2020     History of blood transfusion 2009    [facility]     Uncomplicated asthma        Past Surgical History:   Procedure Laterality Date     BIOPSY  2018    colon polyp, [place]     CL AFF SURGICAL PATHOLOGY  2009    [facility]     COLONOSCOPY  2018     CV CORONARY ANGIOGRAM N/A 12/9/2020    Procedure: CV CORONARY ANGIOGRAM;  Surgeon: Rick Humphrey MD;  Location:  HEART CARDIAC CATH LAB     CV  CORONARY ANGIOGRAM N/A 12/10/2020    Procedure: CV CORONARY ANGIOGRAM;  Surgeon: Sadi Peña MD;  [facility]     CV CORONARY ANGIOGRAM N/A 1/14/2021    Procedure: CV CORONARY ANGIOGRAM;  Surgeon: Sadi Peña MD;  [facility]     CV FRACTIONAL FLOW RESERVE N/A 1/14/2021    Procedure: Fractional Flow Reserve;  Surgeon: Sadi Peña MD;  [facility]     CV PCI ANGIOPLASTY N/A 12/10/2020    Procedure: CV PCI ANGIOPLASTY;  Surgeon: Sadi Peña MD;  [facility]     CV PCI ANGIOPLASTY N/A 1/14/2021    Procedure: CV PCI ANGIOPLASTY;  Surgeon: Sadi Peña MD;  [facility]: [facility]     CV RIGHT HEART CATH MEASUREMENTS RECORDED N/A 12/9/2020    Procedure: CV RIGHT HEART CATH;  Surgeon: Rick Humphrey MD;  Location:  HEART CARDIAC CATH LAB     ORTHOPEDIC SURGERY  1990    hand       Family History   Problem Relation Age of Onset     Lupus Mother      Alzheimer Disease Mother         passed away in her 80's      Raynaud syndrome Mother      Liver Cancer Father         passed away from liver cancer in his mid 80's     Bone Cancer Father      Prostate Cancer Father        Social History     Tobacco Use     Smoking status: Never Smoker     Smokeless tobacco: Never Used     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   Substance Use Topics     Alcohol use: Yes     Frequency: 2-4 times a month     Drinks per session: 1 or 2     Binge frequency: Never     Comment: rare       Current Outpatient Medications   Medication     aspirin (ASA) 81 MG EC tablet     atorvastatin (LIPITOR) 40 MG tablet     calcium carbonate 600 mg-vitamin D 400 units (CALTRATE) 600-400 MG-UNIT per tablet     cholecalciferol 25 MCG (1000 UT) TABS     clopidogrel (PLAVIX) 75 MG tablet     clopidogrel (PLAVIX) 75 MG tablet     clopidogrel (PLAVIX) 75 MG tablet      nintedanib (OFEV) 100 MG capsule     predniSONE (DELTASONE) 5 MG tablet     No current facility-administered medications for this visit.         Physical exam:  GEN: NAD  Eyes: EOMI  Ears: hearing intact  Mouth: MMM  Neck: full ROM  Cardiopulmonary: non labored, not on O2 at rest  Skin: no jaundice  Neuro: awake and oriented  MSK: moves arms equally  Psych: normal affect     Labs: As per HPI    Imaging: As per HPI    Endoscopy: As per HPI      Assessment and recommendations:   Mr. Victor is a 70-year-old male with history of interstitial lung disease, coronary artery disease status post recent PCI on dual antiplatelet therapy, who was incidentally noted to have abnormal esophageal manometry during work-up for lung transplant for his ILD.    #1 Jackhammer esophagus  #2 Borderline EGJ outflow obstruction    The above manometric findings were incidentally noted during esophageal manometry obtained during transplant evaluation for his ILD.  He is completely asymptomatic from an esophageal standpoint, and would otherwise not have had manometry or pH impedance testing performed.    We discussed at length the above findings.  The jackhammer esophagus may be a sequela of borderline EGJ outflow obstruction (EGJOO).  It is unclear what is causing this finding.  He does not have any large masses appreciated on CT chest, however smaller lesions such as small cancers or strictures have not been ruled out endoscopically, although this is quite unlikely given his lack of risk factors and symptoms.     To be able to confidently say that there is no mechanical lesion leading to borderline EGJOO, EGD would be recommended to directly visualize the esophageal mucosa.  Again, however, the likelihood of finding anything is extremely low, and Mr. Victor prefers to avoid additional invasive testing given the other more pressing medical comorbidities he is dealing with.  This is very reasonable, and for now we will plan to not  investigate further with EGD.  However, if he were to develop symptoms of E GJ outflow obstruction or jackhammer esophagus (chest pain, dysphagia, food impaction) or severe reflux, EGD could be reconsidered at that time.    PLAN:  - No EGD given manometric findings are incidental and patient is asymptomatic, with very low yield of endoscopic evaluation  - Reconsider EGD if becomes lung transplant candidate to exclude very low likelihood of malignancy, or if he develops symptoms as above    RTC PRN    Above discussed with Dr. Rahul Harrell MD  Gastroenterology Fellow  [facility]  Division of Gastroenterology  [phone]      I saw and evaluated the patient, participating in the key portions of the service. I reviewed the fellow's note. I agree with the fellow s findings and plan.    69 yo man with pulmonary and cardiac disease who was incidentally noted to have jackhammer esophagus and borderline EGJ outflow obstruction.     Asymptomatic in regards to manometry findings, which were done for lung transplant workup, although now he is not a transplant candidate. No risk factors for Luna's or EAC/SCC except for race and age- no GERD, obesity, smoking, or family history.     As a standard approach (or if he were a lung transplant candidate), it would be reasonable to perform endoscopy to rule out any anatomic lesions. However, he has other serious comorbidities and prefers to forgo additional workup for now, which I also think is a reasonable approach for an incidental, asymptomatic finding. Should he develop symptoms, become a transplant candidate, or change his mind, I would recommend EGD for evaluation.    He has been discussing surveillance colonoscopy with other providers with the plan to wait until he can safely stop some of his cardiac medications to reduce bleeding risk. He had an advanced adenoma (tubulovillous adenoma) in 2018 and will need follow up colonoscopy when able to safely  stop clopidogrel, depending on life expectancy.    Breezy Kay MD    [facility]  Division of Gastroenterology  [phone]

## 2021-02-16 NOTE — PATIENT INSTRUCTIONS
It was a pleasure taking care of you today.  I've included a brief summary of our discussion and care plan from today's visit below.  Please review this information with your primary care provider.  _______________________________________________________________________    My recommendations are summarized as follows:  - call us if you were to develop trouble swallowing, chest pain, or GERD symptoms  - if you change your mind and would like an endoscopy to make certain there are no abnormalities in your food pipe, please call us and we will organize the procedure    Please call our nurse Shani with any questions or concerns- [phone].  --    Return to GI Clinic as needed to review your progress.    _______________________________________________________________________    Who do I call with any questions after my visit?  Please be in touch if there are any further questions that arise following today's visit.  There are multiple ways to contact your gastroenterology care team.        During business hours, you may reach a Gastroenterology nurse at [phone] and choose option 3.         To schedule or reschedule an appointment, please call [phone].       You can always send a secure message through [employer].  [employer] messages are answered by your nurse or doctor typically within 24 hours.  Please allow extra time on weekends and holidays.        For urgent/emergent questions after business hours, you may reach the on-call GI Fellow by contacting the [facility] at [phone].     How will I get the results of any tests ordered?    You will receive all of your results.  If you have signed up for [employer], any tests ordered at your visit will be available to you after your physician reviews them.  Typically this takes 1-2 weeks.  If there are urgent results that require a change in your care plan, your physician or nurse will call you to discuss the next steps.      What is  [employer]?  [employer] is a secure way for you to access all of your healthcare records from the [facility].  It is a web based computer program, so you can sign on to it from any location.  It also allows you to send secure messages to your care team.  I recommend signing up for [employer] access if you have not already done so and are comfortable with using a computer.      How to I schedule a follow-up visit?  If you did not schedule a follow-up visit today, please call [phone] to schedule a follow-up office visit.        Sincerely,    Breezy Kay MD     [facility]  Division of Gastroenterology

## 2021-02-16 NOTE — NURSING NOTE
"Chief Complaint   Patient presents with     Video Visit     Follow up manometry       Vitals:    02/16/21 1213   Weight: 150 lb   Height: 5' 11\"       Body mass index is 20.92 kg/m .    Ngozi Leal MA    "

## 2021-02-16 NOTE — Clinical Note
Kirk Kevin,     We discussed EGD and he's feeling a bit overwhelmed with cardiac and pulmonary issues. EGD would likely be unremarkable and really just recommended out of an abundance of caution.     Thanks,     Breezy

## 2021-02-16 NOTE — PROGRESS NOTES
"Eric Victor is a 70 year old male who is being evaluated via a billable video visit.      The patient has been notified of following:     \"This video visit will be conducted via a call between you and your physician/provider. We have found that certain health care needs can be provided without the need for an in-person physical exam.  This service lets us provide the care you need with a video conversation.  If a prescription is necessary we can send it directly to your pharmacy.  If lab work is needed we can place an order for that and you can then stop by our lab to have the test done at a later time.    If during the course of the call the physician/provider feels a video visit is not appropriate, you will not be charged for this service.\"     Patient confirmed that they are in [place] for today's visit     If the video visit is dropped, please send link to:   Text to cell phone: [phone]    Video-Visit Details  Type of service:  Video Visit    Video Start Time: 12:50PM  Video End Time:  1:15PM    Originating Location (pt. Location): Home    Distant Location (provider location):  [facility] [place]     Platform used: [employer]    45 minutes spent on the date of the encounter doing chart review, test interpretation, history and exam, documentation and further activities as noted above.          "

## 2021-02-16 NOTE — LETTER
2/16/2021         RE: Eric Victor  [address]  [place] [address]        Dear Colleague,    Thank you for referring your patient, Eric Victor, to the [facility] [place]. Please see a copy of my visit note below.    Referring provider: Dr. Jamil    CC: 70 year old male referred by Dr. Jamil for evaluation of abnormal esophageal manometry obtained during lung transplant work-up.    HPI:   Mr. Victor is a 70-year-old male with history of interstitial lung disease, coronary artery disease status post recent PCI on dual antiplatelet therapy, who was incidentally noted to have abnormal esophageal manometry during work-up for lung transplant for his ILD.    He denies any dysphagia, odynophagia, chest pain, heartburn, reflux, regurgitation, food impaction, nausea, vomiting, dyspepsia, or other symptoms of esophageal diseases.    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    PH impedence testing notable for mildly elevated weakly acidic events, with normal DeMeester score at 9.1, and normal acid exposure time at 2.9%.    He has had a CT chest numerous occasions for his ILD, which showed no evidence of esophageal wall abnormalities.  He does have a rather tortuous esophagus as well as tortuous aorta.  He has not undergone an upper endoscopy or esophagram.  Colonoscopy in 2018 notable for 7 polyps, largest was 13 mm, 5 of which were tubular adenomas, one tubulovillous adenoma.    A 10 point ROS is otherwise negative.    Past Medical History:   Diagnosis Date     Coronary atherosclerosis of native coronary vessel 11/11/2020     History of blood transfusion 2009    [facility]     Uncomplicated asthma        Past Surgical History:   Procedure Laterality  Date     BIOPSY  2018    colon polyp, [place]     CL AFF SURGICAL PATHOLOGY  2009    [facility]     COLONOSCOPY  2018     CV CORONARY ANGIOGRAM N/A 12/9/2020    Procedure: CV CORONARY ANGIOGRAM;  Surgeon: Rick Humphrey MD;  [facility]     CV CORONARY ANGIOGRAM N/A 12/10/2020    Procedure: CV CORONARY ANGIOGRAM;  Surgeon: Sadi Peña MD;  [facility]: [facility]     CV CORONARY ANGIOGRAM N/A 1/14/2021    Procedure: CV CORONARY ANGIOGRAM;  Surgeon: Sadi Peña MD;  [facility]     CV FRACTIONAL FLOW RESERVE N/A 1/14/2021    Procedure: Fractional Flow Reserve;  Surgeon: Sadi Peña MD;  [facility]: [facility]     CV PCI ANGIOPLASTY N/A 12/10/2020    Procedure: CV PCI ANGIOPLASTY;  Surgeon: Sadi Peña MD;  Location: [facility]     CV PCI ANGIOPLASTY N/A 1/14/2021    Procedure: CV PCI ANGIOPLASTY;  Surgeon: Sadi Peña MD;  Location: [facility]     CV RIGHT HEART CATH MEASUREMENTS RECORDED N/A 12/9/2020    Procedure: CV RIGHT HEART CATH;  Surgeon: Rick Humphrey MD;  Location: [facility]     ORTHOPEDIC SURGERY  1990    hand       Family History   Problem Relation Age of Onset     Lupus Mother      Alzheimer Disease Mother         passed away in her 80's      Raynaud syndrome Mother      Liver Cancer Father         passed away from liver cancer in his mid 80's     Bone Cancer Father      Prostate Cancer Father        Social History     Tobacco Use     Smoking status: Never Smoker     Smokeless tobacco: Never Used     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   Substance Use Topics     Alcohol use: Yes     Frequency: 2-4 times a month     Drinks per session: 1 or 2     Binge frequency: Never     Comment: rare       Current Outpatient Medications   Medication     aspirin (ASA) 81 MG EC tablet      atorvastatin (LIPITOR) 40 MG tablet     calcium carbonate 600 mg-vitamin D 400 units (CALTRATE) 600-400 MG-UNIT per tablet     cholecalciferol 25 MCG (1000 UT) TABS     clopidogrel (PLAVIX) 75 MG tablet     clopidogrel (PLAVIX) 75 MG tablet     clopidogrel (PLAVIX) 75 MG tablet     nintedanib (OFEV) 100 MG capsule     predniSONE (DELTASONE) 5 MG tablet     No current facility-administered medications for this visit.         Physical exam:  GEN: NAD  Eyes: EOMI  Ears: hearing intact  Mouth: MMM  Neck: full ROM  Cardiopulmonary: non labored, not on O2 at rest  Skin: no jaundice  Neuro: awake and oriented  MSK: moves arms equally  Psych: normal affect     Labs: As per HPI    Imaging: As per HPI    Endoscopy: As per HPI      Assessment and recommendations:   Mr. Victor is a 70-year-old male with history of interstitial lung disease, coronary artery disease status post recent PCI on dual antiplatelet therapy, who was incidentally noted to have abnormal esophageal manometry during work-up for lung transplant for his ILD.    #1 Jackhammer esophagus  #2 Borderline EGJ outflow obstruction    The above manometric findings were incidentally noted during esophageal manometry obtained during transplant evaluation for his ILD.  He is completely asymptomatic from an esophageal standpoint, and would otherwise not have had manometry or pH impedance testing performed.    We discussed at length the above findings.  The jackhammer esophagus may be a sequela of borderline EGJ outflow obstruction (EGJOO).  It is unclear what is causing this finding.  He does not have any large masses appreciated on CT chest, however smaller lesions such as small cancers or strictures have not been ruled out endoscopically, although this is quite unlikely given his lack of risk factors and symptoms.     To be able to confidently say that there is no mechanical lesion leading to borderline EGJOO, EGD would be recommended to directly visualize the  esophageal mucosa.  Again, however, the likelihood of finding anything is extremely low, and Mr. Victor prefers to avoid additional invasive testing given the other more pressing medical comorbidities he is dealing with.  This is very reasonable, and for now we will plan to not investigate further with EGD.  However, if he were to develop symptoms of E GJ outflow obstruction or jackhammer esophagus (chest pain, dysphagia, food impaction) or severe reflux, EGD could be reconsidered at that time.    PLAN:  - No EGD given manometric findings are incidental and patient is asymptomatic, with very low yield of endoscopic evaluation  - Reconsider EGD if becomes lung transplant candidate to exclude very low likelihood of malignancy, or if he develops symptoms as above    RTC PRN    Above discussed with Dr. Rahul Harrell MD  Gastroenterology Fellow  [facility]  Division of Gastroenterology  [phone]      I saw and evaluated the patient, participating in the key portions of the service. I reviewed the fellow's note. I agree with the fellow s findings and plan.    69 yo man with pulmonary and cardiac disease who was incidentally noted to have jackhammer esophagus and borderline EGJ outflow obstruction.     Asymptomatic in regards to manometry findings, which were done for lung transplant workup, although now he is not a transplant candidate. No risk factors for Luna's or EAC/SCC except for race and age- no GERD, obesity, smoking, or family history.     As a standard approach (or if he were a lung transplant candidate), it would be reasonable to perform endoscopy to rule out any anatomic lesions. However, he has other serious comorbidities and prefers to forgo additional workup for now, which I also think is a reasonable approach for an incidental, asymptomatic finding. Should he develop symptoms, become a transplant candidate, or change his mind, I would recommend EGD for evaluation.    He  "has been discussing surveillance colonoscopy with other providers with the plan to wait until he can safely stop some of his cardiac medications to reduce bleeding risk. He had an advanced adenoma (tubulovillous adenoma) in 2018 and will need follow up colonoscopy when able to safely stop clopidogrel, depending on life expectancy.    Breezy Kay MD    [facility]  Division of Gastroenterology  [phone]      Eric Victor is a 70 year old male who is being evaluated via a billable video visit.      The patient has been notified of following:     \"This video visit will be conducted via a call between you and your physician/provider. We have found that certain health care needs can be provided without the need for an in-person physical exam.  This service lets us provide the care you need with a video conversation.  If a prescription is necessary we can send it directly to your pharmacy.  If lab work is needed we can place an order for that and you can then stop by our lab to have the test done at a later time.    If during the course of the call the physician/provider feels a video visit is not appropriate, you will not be charged for this service.\"     Patient confirmed that they are in [place] for today's visit     If the video visit is dropped, please send link to:   Text to cell phone: [phone]    Video-Visit Details  Type of service:  Video Visit    Video Start Time: 12:50PM  Video End Time:  1:15PM    Originating Location (pt. Location): Home    Distant Location (provider location):  [facility] [place]     Platform used: [employer]    45 minutes spent on the date of the encounter doing chart review, test interpretation, history and exam, documentation and further activities as noted above.    Again, thank you for allowing me to participate in the care of your patient.      Sincerely,    Breezy Kay MD    "

## 2021-09-04 ENCOUNTER — HEALTH MAINTENANCE LETTER (OUTPATIENT)
Age: 71
End: 2021-09-04

## 2022-01-01 ENCOUNTER — HEALTH MAINTENANCE LETTER (OUTPATIENT)
Age: 72
End: 2022-01-01

## 2022-02-19 ENCOUNTER — HEALTH MAINTENANCE LETTER (OUTPATIENT)
Age: 72
End: 2022-02-19

## 2022-03-30 ENCOUNTER — MEDICAL CORRESPONDENCE (OUTPATIENT)
Dept: HEALTH INFORMATION MANAGEMENT | Facility: CLINIC | Age: 72
End: 2022-03-30
Payer: MEDICARE

## 2023-01-01 ENCOUNTER — HEALTH MAINTENANCE LETTER (OUTPATIENT)
Age: 73
End: 2023-01-01

## 2023-06-26 NOTE — PROGRESS NOTES
Following class, took time to review evaluation schedule for next few weeks and to answer any questions.       Discussed that visits with coordinator for lung transplant and heart cath education will be through video with phone back-up.  Social work visits are by video, and Nutrition by phone.      Discussed that it is strongly recommended that patients follow the evaluation schedule received in [facility] or by email. They may received some additional confirmation letters that conflict, and the [facility] appointments may not match, so it is important that the Evaluation Schedule letter is what should be followed.  Conflicts should be confirmed with coordinator.       Confirmed location of cardiopulmonary rehab appointment.  [place] [facility] [place]      Reviewed remainder of testing and answered questions about heart cath and pH study.    Confirmed that patient should continue (not hold) any antacids or PPIs they are on for the pH study.      Confirmed that oxygen is available while at the clinic and hospital, patient needs own oxygen for travel to and from facility and for hotel if from out of town.     Requested that patient contact coordinator with any additional questions or conflicts throughout the week.  If unable to reach coordinator directly, they can call the transplant office and request that coordinator be paged.     Archie is not sure whether he will bring his POC to clinic, can use the oxygen at the clinic.  He tends not to use it except at night.  Archie is walking close to 4000+ steps daily.  Using his POC that he needs to pull is very challenging while walking.  Reinforced need to find a way to use his oxygen with activity due to evidence of desaturation on most recent 6 mw.          Refill request received for Levetiracetam 750    Last office visit 1/25/22  No follow up scheduled    Last prescribed 10/10/22 +8 refills

## 2024-10-03 NOTE — PRE-PROCEDURE
GENERAL PRE-PROCEDURE:   Procedure:  Coronary angiogram with possible PCI and right heart catheterization   Date/Time:  12/9/2020 12:36 PM    Verbal consent obtained?: Yes    Written consent obtained?: Yes    Risks and benefits: Risks, benefits and alternatives were discussed    Consent given by:  Patient  Patient states understanding of procedure being performed: Yes    Patient's understanding of procedure matches consent: Yes    Procedure consent matches procedure scheduled: Yes    Expected level of sedation:  Minimal  Appropriately NPO:  Yes  ASA Class:  Class 2- mild systemic disease, no acute problems, no functional limitations  Mallampati  :  Grade 2- soft palate, base of uvula, tonsillar pillars, and portion of posterior pharyngeal wall visible  Lungs:  Lungs clear with good breath sounds bilaterally  Heart:  Normal heart sounds and rate  History & Physical reviewed:  History and physical reviewed and no updates needed  Statement of review:  I have reviewed the lab findings, diagnostic data, medications, and the plan for sedation    JESSA Helms, CNP  Structural Heart Disease Nurse Practitioner  [facility]    
Negative

## (undated) DEVICE — VALVE HEMOSTASIS .096" COPILOT MECH 1003331

## (undated) DEVICE — CATH BALLOON EMERGE 2.0X12MM H7493918912200

## (undated) DEVICE — CATH GUIDING IKARI 6FR IL3.5 LEFT HEARTRAIL 40-6370

## (undated) DEVICE — INTRO SHEATH 6FRX25CM PINNACLE RSS606

## (undated) DEVICE — CATH GUIDING BLUE YELLOW PTFE XB4 6FRX100CM 67005600

## (undated) DEVICE — CATH BALLOON NC EMERGE 5.00X12MM H7493926712500

## (undated) DEVICE — KIT HAND CONTROL ACIST 014644 AR-P54

## (undated) DEVICE — SHTH INTRO 0.021IN ID 6FR DIA

## (undated) DEVICE — WIRE GUIDE HI-TRQ ALL STAR 3CM JTIP 0.014"X190CM 1001740J

## (undated) DEVICE — Device

## (undated) DEVICE — PACK HEART LEFT CUSTOM

## (undated) DEVICE — CLOSURE ANGIOSEAL 6FR 610130

## (undated) DEVICE — 1.5MM X 12MM TAKERU RX PTCA BALLOON DILATION CATHETER

## (undated) DEVICE — GUIDEWIRE ASAHI SION BLUE 0.014"X180CM J-TIP AHW14R004J

## (undated) DEVICE — CATH BALLOON NC EMERGE 4.50X8MM H7493926708450

## (undated) DEVICE — FASTENER CATH BALLOON CLAMPX2 STATLOCK 0684-00-493

## (undated) DEVICE — CATH GUIDING BLUE YELLOW PTFE JR4 6FRX100CM 67008200

## (undated) DEVICE — CATH BALLOON EMERGE 3.5X12MM H7493918912350

## (undated) DEVICE — INTRO SHEATH 7FRX10CM PINNACLE RSS702

## (undated) DEVICE — WIRE GUIDE HI-TRQ  WHISPER MS JTIP 0.014"X190CM 1005357HJ

## (undated) DEVICE — CATH BALLOON NC EMERGE 4.50X20MM H7493926720450

## (undated) DEVICE — MANIFOLD KIT ANGIO AUTOMATED 014613

## (undated) DEVICE — CATH BALLOON EMERGE 2.0X20MM H7493918920200

## (undated) DEVICE — CATH US OD 5FR OD SEC 2.9FR EAGLE EYE PLATINUM 0.014 85900P

## (undated) DEVICE — 0.035IN X 260CM, EMERALD DIAGNOSTIC GUIDEWIRE, FIXED-CORE PTFE COATED, STANDARD, 3MM EXCHANGE J-TIP (EA/1)

## (undated) DEVICE — SLEEVE TR BAND RADIAL COMPRESSION DEVICE 24CM TRB24-REG

## (undated) DEVICE — CATH MICRO RESERVE SYSTEM ULTRA THIN 14667

## (undated) DEVICE — KIT ACCESSORY INTRO INFLATION SYS 20/30 PRIORITY 1000186-115

## (undated) DEVICE — CATH GUIDING BLUE YELLOW PTFE XB3.5 6FRX100CM 67005400

## (undated) DEVICE — TUBING PRESSURE 30"

## (undated) RX ORDER — FENTANYL CITRATE 50 UG/ML
INJECTION, SOLUTION INTRAMUSCULAR; INTRAVENOUS
Status: DISPENSED
Start: 2020-12-10

## (undated) RX ORDER — LIDOCAINE HYDROCHLORIDE 10 MG/ML
INJECTION, SOLUTION EPIDURAL; INFILTRATION; INTRACAUDAL; PERINEURAL
Status: DISPENSED
Start: 2020-12-09

## (undated) RX ORDER — HEPARIN SODIUM 1000 [USP'U]/ML
INJECTION, SOLUTION INTRAVENOUS; SUBCUTANEOUS
Status: DISPENSED
Start: 2020-12-09

## (undated) RX ORDER — SODIUM CHLORIDE 9 MG/ML
INJECTION, SOLUTION INTRAVENOUS
Status: DISPENSED
Start: 2020-12-10

## (undated) RX ORDER — VERAPAMIL HYDROCHLORIDE 2.5 MG/ML
INJECTION, SOLUTION INTRAVENOUS
Status: DISPENSED
Start: 2020-12-09

## (undated) RX ORDER — FENTANYL CITRATE 50 UG/ML
INJECTION, SOLUTION INTRAMUSCULAR; INTRAVENOUS
Status: DISPENSED
Start: 2020-12-09

## (undated) RX ORDER — NITROGLYCERIN 5 MG/ML
VIAL (ML) INTRAVENOUS
Status: DISPENSED
Start: 2020-12-09

## (undated) RX ORDER — SODIUM CHLORIDE 9 MG/ML
INJECTION, SOLUTION INTRAVENOUS
Status: DISPENSED
Start: 2021-01-14

## (undated) RX ORDER — HEPARIN SODIUM 1000 [USP'U]/ML
INJECTION, SOLUTION INTRAVENOUS; SUBCUTANEOUS
Status: DISPENSED
Start: 2020-12-10

## (undated) RX ORDER — ALBUTEROL SULFATE 0.83 MG/ML
SOLUTION RESPIRATORY (INHALATION)
Status: DISPENSED
Start: 2020-12-07

## (undated) RX ORDER — SODIUM CHLORIDE 9 MG/ML
INJECTION, SOLUTION INTRAVENOUS
Status: DISPENSED
Start: 2020-12-09

## (undated) RX ORDER — LIDOCAINE HYDROCHLORIDE 20 MG/ML
SOLUTION OROPHARYNGEAL
Status: DISPENSED
Start: 2020-12-08

## (undated) RX ORDER — NITROGLYCERIN 5 MG/ML
VIAL (ML) INTRAVENOUS
Status: DISPENSED
Start: 2020-12-10